# Patient Record
Sex: FEMALE | Race: WHITE | NOT HISPANIC OR LATINO | Employment: FULL TIME | URBAN - METROPOLITAN AREA
[De-identification: names, ages, dates, MRNs, and addresses within clinical notes are randomized per-mention and may not be internally consistent; named-entity substitution may affect disease eponyms.]

---

## 2022-10-22 ENCOUNTER — APPOINTMENT (OUTPATIENT)
Dept: URGENT CARE | Facility: CLINIC | Age: 22
End: 2022-10-22

## 2023-06-20 ENCOUNTER — OFFICE VISIT (OUTPATIENT)
Dept: MEDICAL GROUP | Facility: MEDICAL CENTER | Age: 23
End: 2023-06-20
Payer: COMMERCIAL

## 2023-06-20 VITALS
HEART RATE: 60 BPM | SYSTOLIC BLOOD PRESSURE: 100 MMHG | BODY MASS INDEX: 23.25 KG/M2 | WEIGHT: 148.15 LBS | OXYGEN SATURATION: 98 % | DIASTOLIC BLOOD PRESSURE: 64 MMHG | RESPIRATION RATE: 16 BRPM | HEIGHT: 67 IN | TEMPERATURE: 97.4 F

## 2023-06-20 DIAGNOSIS — Z12.83 SKIN EXAM, SCREENING FOR CANCER: ICD-10-CM

## 2023-06-20 DIAGNOSIS — Z23 IMMUNIZATION DUE: ICD-10-CM

## 2023-06-20 DIAGNOSIS — Z00.00 ENCOUNTER FOR MEDICAL EXAMINATION TO ESTABLISH CARE: ICD-10-CM

## 2023-06-20 DIAGNOSIS — R22.31 LUMP OF AXILLA, RIGHT: ICD-10-CM

## 2023-06-20 DIAGNOSIS — Z97.5 IUD (INTRAUTERINE DEVICE) IN PLACE: ICD-10-CM

## 2023-06-20 PROCEDURE — 3078F DIAST BP <80 MM HG: CPT | Performed by: STUDENT IN AN ORGANIZED HEALTH CARE EDUCATION/TRAINING PROGRAM

## 2023-06-20 PROCEDURE — 3074F SYST BP LT 130 MM HG: CPT | Performed by: STUDENT IN AN ORGANIZED HEALTH CARE EDUCATION/TRAINING PROGRAM

## 2023-06-20 PROCEDURE — 99204 OFFICE O/P NEW MOD 45 MIN: CPT | Performed by: STUDENT IN AN ORGANIZED HEALTH CARE EDUCATION/TRAINING PROGRAM

## 2023-06-20 ASSESSMENT — PATIENT HEALTH QUESTIONNAIRE - PHQ9: CLINICAL INTERPRETATION OF PHQ2 SCORE: 0

## 2023-06-20 NOTE — PROGRESS NOTES
"Subjective:     CC:  Diagnoses of Encounter for medical examination to establish care, Lump of axilla, right, Skin exam, screening for cancer, IUD (intrauterine device) in place, and Immunization due were pertinent to this visit.    HISTORY OF THE PRESENT ILLNESS: Patient is a 23 y.o. female. This pleasant patient is here today to establish care and discuss the following    Problem   Lump of Axilla, Right    This is a chronic condition, never evaluated.  She has a lump in the right axilla.  She states it has been there for about a year.  She does not have any other lumps or bumps.  She does not have any family history of cancer apart from skin cancers.       ROS:   ROS      Objective:     Exam: /64   Pulse 60   Temp 36.3 °C (97.4 °F) (Temporal)   Resp 16   Ht 1.702 m (5' 7\")   Wt 67.2 kg (148 lb 2.4 oz)   SpO2 98%  Body mass index is 23.2 kg/m².    Physical Exam  Vitals reviewed.   Constitutional:       General: She is not in acute distress.     Appearance: She is not toxic-appearing.   HENT:      Head: Normocephalic and atraumatic.      Right Ear: External ear normal.      Left Ear: External ear normal.   Eyes:      General:         Right eye: No discharge.         Left eye: No discharge.      Extraocular Movements: Extraocular movements intact.      Conjunctiva/sclera: Conjunctivae normal.   Cardiovascular:      Rate and Rhythm: Normal rate and regular rhythm.      Pulses: Normal pulses.      Heart sounds: Normal heart sounds.   Pulmonary:      Effort: Pulmonary effort is normal. No respiratory distress.      Breath sounds: Normal breath sounds. No wheezing or rales.   Musculoskeletal:      Comments: 2 cm hard fixed lesion in the right axilla, nontender   Skin:     General: Skin is warm and dry.   Neurological:      Mental Status: She is alert.   Psychiatric:         Mood and Affect: Mood normal.         Behavior: Behavior normal.         Thought Content: Thought content normal.         Judgment: " Judgment normal.         Assessment & Plan:   23 y.o. female with the following -    1. Encounter for medical examination to establish care  History, problem list, medications and allergies reviewed.  Records requested from previous provider if applicable.    2. Lump of axilla, right  This is a chronic condition, never evaluated.  It has been there for about a year.  May be a lipoma, could also be a lymph node.  Unsure of diagnosis or prognosis.  Ultrasound ordered  - US-EXTREMITY NON VASCULAR UNILATERAL RIGHT; Future    3. Skin exam, screening for cancer  - Referral to Dermatology    4. IUD (intrauterine device) in place  - Referral to OB/Gyn    5. Immunization due  Per chart she is due for Tdap, she thinks she may have got this in 2020, she is going to check her records      No follow-ups on file.    Please note that this dictation was created using voice recognition software. I have made every reasonable attempt to correct obvious errors, but I expect that there are errors of grammar and possibly content that I did not discover before finalizing the note.

## 2023-06-30 ENCOUNTER — HOSPITAL ENCOUNTER (OUTPATIENT)
Dept: RADIOLOGY | Facility: MEDICAL CENTER | Age: 23
End: 2023-06-30
Payer: COMMERCIAL

## 2023-07-11 ENCOUNTER — HOSPITAL ENCOUNTER (OUTPATIENT)
Dept: RADIOLOGY | Facility: MEDICAL CENTER | Age: 23
End: 2023-07-11
Attending: STUDENT IN AN ORGANIZED HEALTH CARE EDUCATION/TRAINING PROGRAM
Payer: COMMERCIAL

## 2023-07-11 DIAGNOSIS — R22.31 LUMP OF AXILLA, RIGHT: ICD-10-CM

## 2023-07-11 PROCEDURE — 76642 ULTRASOUND BREAST LIMITED: CPT | Mod: RT

## 2023-07-17 ENCOUNTER — APPOINTMENT (OUTPATIENT)
Dept: RADIOLOGY | Facility: MEDICAL CENTER | Age: 23
End: 2023-07-17
Attending: STUDENT IN AN ORGANIZED HEALTH CARE EDUCATION/TRAINING PROGRAM
Payer: COMMERCIAL

## 2023-08-09 ENCOUNTER — OFFICE VISIT (OUTPATIENT)
Dept: DERMATOLOGY | Facility: IMAGING CENTER | Age: 23
End: 2023-08-09
Payer: COMMERCIAL

## 2023-08-09 DIAGNOSIS — L81.4 LENTIGINES: ICD-10-CM

## 2023-08-09 DIAGNOSIS — Z12.83 SKIN CANCER SCREENING: ICD-10-CM

## 2023-08-09 DIAGNOSIS — D22.9 MULTIPLE NEVI: ICD-10-CM

## 2023-08-09 DIAGNOSIS — L70.0 ACNE VULGARIS: ICD-10-CM

## 2023-08-09 DIAGNOSIS — D18.01 CHERRY ANGIOMA: ICD-10-CM

## 2023-08-09 PROCEDURE — 99213 OFFICE O/P EST LOW 20 MIN: CPT | Performed by: NURSE PRACTITIONER

## 2023-08-09 NOTE — PROGRESS NOTES
DERMATOLOGY NOTE  NEW VISIT       Chief complaint: Establish Care (PRETTY)     Acne Mild  Started: Couple months  Active on: Back  Aggravated by: No  Treatment currently used: No  Prior treatments used: No  Face wash/moisturizer: OTC unknown  Family history of scarring acne: No  Contraception: Copper IU  Family h/o breast/uterine/ovarian cancers: No     History of skin cancer: No  History of precancers/actinic keratoses: No  History of biopsies:No  History of blistering/severe sunburns:No  Family history of skin cancer:Yes, Details: Paternal grandparents melanoma  Family history of atypical moles:No      Not on File     MEDICATIONS:  Medications relevant to specialty reviewed.     REVIEW OF SYSTEMS:   Positive for skin (see HPI)  Negative for fevers and chills       EXAM:  There were no vitals taken for this visit.  Constitutional: Well-developed, well-nourished, and in no distress.     A total body skin exam was performed excluding the genitals per patient preference and including the following areas: head (including face), neck, chest, abdomen, groin/buttocks, back, bilateral upper extremities, and bilateral lower extremities with the following pertinent findings listed below and/or in assessment/plan.     -Sun exposed skin of trunk and b/l upper, lower extremities and face with very few scattered clinically benign light brown reticulated macules all of which were morphologically similar and none of which were suspicious for skin cancer today on exam    -Very few scattered 1-3mm bright red macules and thin papules on the trunk and extremities    -Multiple tan medium brown skin-colored macules papules scattered over the trunk, face extremities and scalp--All with benign-appearing pigment network patterns on dermoscopy    1-2 erythematous papules, zero pustules, 1-2 closed comedones--very few lesions on back and face        IMPRESSION / PLAN:    1. Lentigines  - Benign-appearing nature of lesions discussed during exam.    - Advised to continue to monitor for any return to clinic for new or concerning changes.      2. Cherry angioma  - Benign-appearing nature of lesions discussed during exam.   - Advised to continue to monitor for any return to clinic for new or concerning changes..      3. Multiple nevi  - Benign-appearing nature of lesions discussed during exam.   - Advised to continue to monitor for any return to clinic for new or concerning changes.  - ABCDE's of melanoma discussed/handout given      4. Acne vulgaris. mild  Discussed OTC first line treatment options--BPOs, retinols, body washes with BPO or SA  Stressed importance of twice daily face wash and moisturizer    5. Skin cancer screening  Skin cancer education  discussed importance of sun protective clothing, eyewear in addition to the use of broad spectrum sunscreen with SPF 30 or greater, as well as need for reapplication ~every 2 hours when exposed to UVR/handout given  discussed importance following up for any new or changing lesions as noted in handout given, but every 12 months exams in clinic in the setting of dermatologic history  ABCDE's of melanoma discussed/handout given            Please note that this dictation was created using voice recognition software. I have made every reasonable attempt to correct obvious errors, but I expect that there are errors of grammar and possibly content that I did not discover before finalizing the note.      Return to clinic in: Return in about 1 year (around 8/9/2024) for PRETTY. and as needed for any new or changing skin lesions.

## 2024-02-15 ENCOUNTER — OFFICE VISIT (OUTPATIENT)
Dept: MEDICAL GROUP | Facility: MEDICAL CENTER | Age: 24
End: 2024-02-15
Payer: COMMERCIAL

## 2024-02-15 ENCOUNTER — HOSPITAL ENCOUNTER (OUTPATIENT)
Dept: LAB | Facility: MEDICAL CENTER | Age: 24
End: 2024-02-15
Attending: STUDENT IN AN ORGANIZED HEALTH CARE EDUCATION/TRAINING PROGRAM
Payer: COMMERCIAL

## 2024-02-15 VITALS
HEART RATE: 60 BPM | WEIGHT: 143 LBS | SYSTOLIC BLOOD PRESSURE: 124 MMHG | TEMPERATURE: 97.3 F | OXYGEN SATURATION: 98 % | DIASTOLIC BLOOD PRESSURE: 72 MMHG | HEIGHT: 67 IN | BODY MASS INDEX: 22.44 KG/M2

## 2024-02-15 DIAGNOSIS — Z11.1 SCREENING-PULMONARY TB: ICD-10-CM

## 2024-02-15 DIAGNOSIS — Z11.59 NEED FOR HEPATITIS C SCREENING TEST: ICD-10-CM

## 2024-02-15 DIAGNOSIS — Z01.84 IMMUNITY STATUS TESTING: ICD-10-CM

## 2024-02-15 DIAGNOSIS — Z11.4 ENCOUNTER FOR SCREENING FOR HIV: ICD-10-CM

## 2024-02-15 DIAGNOSIS — Z23 IMMUNIZATION DUE: ICD-10-CM

## 2024-02-15 DIAGNOSIS — Z00.00 ANNUAL PHYSICAL EXAM: ICD-10-CM

## 2024-02-15 LAB
HBV SURFACE AB SERPL IA-ACNC: 16.2 MIU/ML (ref 0–10)
HCV AB SER QL: NORMAL
HIV 1+2 AB+HIV1 P24 AG SERPL QL IA: NORMAL

## 2024-02-15 PROCEDURE — 36415 COLL VENOUS BLD VENIPUNCTURE: CPT

## 2024-02-15 PROCEDURE — 90686 IIV4 VACC NO PRSV 0.5 ML IM: CPT | Performed by: STUDENT IN AN ORGANIZED HEALTH CARE EDUCATION/TRAINING PROGRAM

## 2024-02-15 PROCEDURE — 3074F SYST BP LT 130 MM HG: CPT | Performed by: STUDENT IN AN ORGANIZED HEALTH CARE EDUCATION/TRAINING PROGRAM

## 2024-02-15 PROCEDURE — 90471 IMMUNIZATION ADMIN: CPT | Performed by: STUDENT IN AN ORGANIZED HEALTH CARE EDUCATION/TRAINING PROGRAM

## 2024-02-15 PROCEDURE — 90715 TDAP VACCINE 7 YRS/> IM: CPT | Performed by: STUDENT IN AN ORGANIZED HEALTH CARE EDUCATION/TRAINING PROGRAM

## 2024-02-15 PROCEDURE — 90472 IMMUNIZATION ADMIN EACH ADD: CPT | Performed by: STUDENT IN AN ORGANIZED HEALTH CARE EDUCATION/TRAINING PROGRAM

## 2024-02-15 PROCEDURE — 99395 PREV VISIT EST AGE 18-39: CPT | Mod: 25 | Performed by: STUDENT IN AN ORGANIZED HEALTH CARE EDUCATION/TRAINING PROGRAM

## 2024-02-15 PROCEDURE — 86706 HEP B SURFACE ANTIBODY: CPT

## 2024-02-15 PROCEDURE — 86803 HEPATITIS C AB TEST: CPT

## 2024-02-15 PROCEDURE — 87389 HIV-1 AG W/HIV-1&-2 AB AG IA: CPT

## 2024-02-15 PROCEDURE — 86480 TB TEST CELL IMMUN MEASURE: CPT

## 2024-02-15 PROCEDURE — 3078F DIAST BP <80 MM HG: CPT | Performed by: STUDENT IN AN ORGANIZED HEALTH CARE EDUCATION/TRAINING PROGRAM

## 2024-02-15 ASSESSMENT — PATIENT HEALTH QUESTIONNAIRE - PHQ9: CLINICAL INTERPRETATION OF PHQ2 SCORE: 0

## 2024-02-15 NOTE — PROGRESS NOTES
Subjective:     CC:   Chief Complaint   Patient presents with    Annual Exam    Requesting Labs    Immunizations       HPI:   Beatrice Gomez is a 23 y.o. female who presents for annual exam. She is feeling well and denies any complaints.    Ob-Gyn/ History:    Patient has GYN provider: yes  /Para:  G0   Last Pap Smear:  8/3/2021. No history of abnormal pap smears.  Gyn Surgery:  NA.  Current Contraceptive Method:  Paraguard.     Health Maintenance  Cholesterol Screening: Na   Diabetes Screening: Na   Aspirin Use: NA    Diet: Discussed   Exercise: Discussed     Cancer screening  Colorectal Cancer Screening: Start at age 45    Lung Cancer Screening: NA    Cervical Cancer Screening: Up to date   Breast Cancer Screening: Start at age 40-50     Infectious disease screening/Immunizations  --HIV Screening: Ordered   --Hepatitis C Screening: Ordered   --Immunizations: TDaP and flu today    She  has no past medical history on file.  She  has a past surgical history that includes reconstruction, knee, acl (Bilateral).    Family History   Problem Relation Age of Onset    Diabetes Paternal Grandfather     Skin cancer Other     Ovarian Cancer Neg Hx     Tubal Cancer Neg Hx     Peritoneal Cancer Neg Hx     Colorectal Cancer Neg Hx     Breast Cancer Neg Hx        Social History     Socioeconomic History    Marital status: Single     Spouse name: Not on file    Number of children: Not on file    Years of education: Not on file    Highest education level: Not on file   Occupational History    Not on file   Tobacco Use    Smoking status: Never    Smokeless tobacco: Never   Substance and Sexual Activity    Alcohol use: Yes     Comment: occasionally    Drug use: Never    Sexual activity: Yes     Partners: Male   Other Topics Concern    Not on file   Social History Narrative    Not on file     Social Determinants of Health     Financial Resource Strain: Not on file   Food Insecurity: Not on file  "  Transportation Needs: Not on file   Physical Activity: Not on file   Stress: Not on file   Social Connections: Not on file   Intimate Partner Violence: Not on file   Housing Stability: Not on file       Patient Active Problem List    Diagnosis Date Noted    Lump of axilla, right 06/20/2023         Current Outpatient Medications   Medication Sig Dispense Refill    PARAGARD INTRAUTERINE COPPER IU by Intrauterine route.       No current facility-administered medications for this visit.     No Known Allergies      Objective:     /72   Pulse 60   Temp 36.3 °C (97.3 °F)   Ht 1.702 m (5' 7\")   Wt 64.9 kg (143 lb)   SpO2 98%   BMI 22.40 kg/m²   Body mass index is 22.4 kg/m².  Wt Readings from Last 4 Encounters:   02/15/24 64.9 kg (143 lb)   06/20/23 67.2 kg (148 lb 2.4 oz)       Physical Exam:  Constitutional: Well-developed and well-nourished. Not diaphoretic. No distress.   Skin: Skin is warm and dry. No rash noted.  Head: Atraumatic without lesions.  Eyes: Conjunctivae and extraocular motions are normal. Pupils are equal, round, and reactive to light. No scleral icterus.   Ears:  External ears unremarkable. Tympanic membranes clear and intact.  Nose: Nares patent. Septum midline. Turbinates without erythema nor edema. No discharge.   Mouth/Throat: Dentition is normal. Tongue normal. Oropharynx is clear and moist. Posterior pharynx without erythema or exudates.  Neck: Supple, trachea midline. Normal range of motion. No thyromegaly present. No lymphadenopathy--cervical or supraclavicular.  Cardiovascular: Regular rate and rhythm, S1 and S2 without murmur, rubs, or gallops.  Lungs: Normal inspiratory effort, CTA bilaterally, no wheezes/rhonchi/rales  Abdomen: Soft, non tender, and without distention. Active bowel sounds in all four quadrants. No rebound, guarding, masses or HSM.  Extremities: No cyanosis, clubbing, erythema, nor edema.   Musculoskeletal: All major joints AROM full in all directions without " pain.  Neurological: Alert and oriented x 3. DTRs 2+/3 and symmetric. No cranial nerve deficit. 5/5 myotomes. .   Psychiatric:  Behavior, mood, and affect are appropriate.      Assessment and Plan:     1. Annual physical exam        2. Immunization due  Tdap =>8yo IM    Influenza Vaccine Quad Injection (PF)      3. Immunity status testing  HEP B SURFACE AB      4. Screening-pulmonary TB  Quantiferon Gold TB (PPD)      5. Need for hepatitis C screening test  HEP C VIRUS ANTIBODY      6. Encounter for screening for HIV  HIV AG/AB COMBO ASSAY SCREENING        Discussed routine screening vaccination, healthy diet and exercise, helmet use, seatbelt use and sunscreen use.  Return for annual in 1 year    Please note that this dictation was created using voice recognition software. I have made every reasonable attempt to correct obvious errors, but I expect that there are errors of grammar and possibly content that I did not discover before finalizing the note.      Follow-up: No follow-ups on file.

## 2024-02-16 LAB
GAMMA INTERFERON BACKGROUND BLD IA-ACNC: 0.04 IU/ML
M TB IFN-G BLD-IMP: NEGATIVE
M TB IFN-G CD4+ BCKGRND COR BLD-ACNC: -0.01 IU/ML
MITOGEN IGNF BCKGRD COR BLD-ACNC: 8.59 IU/ML
QFT TB2 - NIL TBQ2: -0.01 IU/ML

## 2024-06-10 ENCOUNTER — OFFICE VISIT (OUTPATIENT)
Dept: DERMATOLOGY | Facility: IMAGING CENTER | Age: 24
End: 2024-06-10
Payer: COMMERCIAL

## 2024-06-10 DIAGNOSIS — D22.9 MULTIPLE NEVI: ICD-10-CM

## 2024-06-10 DIAGNOSIS — L81.4 LENTIGINES: ICD-10-CM

## 2024-06-10 DIAGNOSIS — L73.8 SEBACEOUS HYPERPLASIA: ICD-10-CM

## 2024-06-10 DIAGNOSIS — D18.01 CHERRY ANGIOMA: ICD-10-CM

## 2024-06-10 DIAGNOSIS — Z12.83 SKIN CANCER SCREENING: ICD-10-CM

## 2024-06-10 DIAGNOSIS — L70.0 ACNE VULGARIS: ICD-10-CM

## 2024-06-10 PROCEDURE — 99212 OFFICE O/P EST SF 10 MIN: CPT | Performed by: NURSE PRACTITIONER

## 2024-06-10 NOTE — PROGRESS NOTES
DERMATOLOGY NOTE  FOLLOW UP VISIT       Chief complaint: Annual Exam (PRETTY)       Annual PRETTY and Fv acne on back, still present.         Acne Mild  Started: Couple months  Active on: Back  Aggravated by: No  Treatment currently used: No  Prior treatments used: No  Face wash/moisturizer: OTC unknown  Family history of scarring acne: No  Contraception: Copper IU  Family h/o breast/uterine/ovarian cancers: No     History of skin cancer: No  History of precancers/actinic keratoses: No  History of biopsies:No  History of blistering/severe sunburns:No  Family history of skin cancer:Yes, Details: Paternal grandparents melanoma  Family history of atypical moles:No      No Known Allergies     MEDICATIONS:  Medications relevant to specialty reviewed.     REVIEW OF SYSTEMS:   Positive for skin (see HPI)  Negative for fevers and chills       EXAM:  There were no vitals taken for this visit.  Constitutional: Well-developed, well-nourished, and in no distress.     A total body skin exam was performed excluding the genitals per patient preference and including the following areas: head (including face), neck, chest, abdomen, groin/buttocks, back, bilateral upper extremities, and bilateral lower extremities with the following pertinent findings listed below and/or in assessment/plan.     -Sun exposed skin of trunk and b/l upper, lower extremities and face with very few scattered clinically benign light brown reticulated macules all of which were morphologically similar and none of which were suspicious for skin cancer today on exam    -Very few scattered 1-3mm bright red macules and thin papules on the trunk and extremities    -Multiple tan medium brown skin-colored macules papules scattered over the trunk, face extremities and scalp--All with benign-appearing pigment network patterns on dermoscopy    1-2 erythematous papules, zero pustules, 1-2 closed comedones--very few lesions on back and face    One yellowish/red papules with  telangiectasias and central dell to forehead      IMPRESSION / PLAN:    1. Lentigines  - Benign-appearing nature of lesions discussed during exam.   - Advised to continue to monitor for any return to clinic for new or concerning changes.      2. Cherry angioma  - Benign-appearing nature of lesions discussed during exam.   - Advised to continue to monitor for any return to clinic for new or concerning changes..      3. Multiple nevi  - Benign-appearing nature of lesions discussed during exam.   - Advised to continue to monitor for any return to clinic for new or concerning changes.  - ABCDE's of melanoma discussed/handout given      4. Acne vulgaris. mild  Discussed OTC first line treatment options--BPOs, retinols, body washes with BPO or SA  Stressed importance of twice daily face wash and moisturizer        5. Sebaceous hyperplasia  - Benign-appearing nature of lesions discussed during exam.   - discussed cosmetic tx and associated office fee  - Advised to continue to monitor for any return to clinic for new or concerning changes.        6. Skin cancer screening  Skin cancer education  discussed importance of sun protective clothing, eyewear in addition to the use of broad spectrum sunscreen with SPF 30 or greater, as well as need for reapplication ~every 2 hours when exposed to UVR/handout given  discussed importance following up for any new or changing lesions as noted in handout given, but every 12 months exams in clinic in the setting of dermatologic history  ABCDE's of melanoma discussed/handout given            Please note that this dictation was created using voice recognition software. I have made every reasonable attempt to correct obvious errors, but I expect that there are errors of grammar and possibly content that I did not discover before finalizing the note.      Return to clinic in: Return in about 1 year (around 6/10/2025) for PRETTY. and as needed for any new or changing skin lesions.

## 2024-10-08 ENCOUNTER — TELEPHONE (OUTPATIENT)
Dept: MEDICAL GROUP | Facility: MEDICAL CENTER | Age: 24
End: 2024-10-08

## 2024-10-08 ENCOUNTER — OFFICE VISIT (OUTPATIENT)
Dept: MEDICAL GROUP | Facility: MEDICAL CENTER | Age: 24
End: 2024-10-08
Payer: COMMERCIAL

## 2024-10-08 VITALS
HEIGHT: 67 IN | BODY MASS INDEX: 23.94 KG/M2 | SYSTOLIC BLOOD PRESSURE: 106 MMHG | OXYGEN SATURATION: 100 % | DIASTOLIC BLOOD PRESSURE: 60 MMHG | HEART RATE: 77 BPM | WEIGHT: 152.56 LBS | TEMPERATURE: 97.6 F

## 2024-10-08 DIAGNOSIS — R22.31 LUMP OF AXILLA, RIGHT: ICD-10-CM

## 2024-10-08 PROCEDURE — 3074F SYST BP LT 130 MM HG: CPT | Performed by: FAMILY MEDICINE

## 2024-10-08 PROCEDURE — 3078F DIAST BP <80 MM HG: CPT | Performed by: FAMILY MEDICINE

## 2024-10-08 PROCEDURE — 99214 OFFICE O/P EST MOD 30 MIN: CPT | Performed by: FAMILY MEDICINE

## 2024-10-08 ASSESSMENT — ENCOUNTER SYMPTOMS
FEVER: 0
CHILLS: 0
PALPITATIONS: 0

## 2024-11-01 ENCOUNTER — APPOINTMENT (OUTPATIENT)
Dept: RADIOLOGY | Facility: MEDICAL CENTER | Age: 24
End: 2024-11-01
Attending: FAMILY MEDICINE
Payer: COMMERCIAL

## 2024-12-04 ENCOUNTER — HOSPITAL ENCOUNTER (OUTPATIENT)
Dept: RADIOLOGY | Facility: MEDICAL CENTER | Age: 24
End: 2024-12-04
Attending: FAMILY MEDICINE
Payer: COMMERCIAL

## 2024-12-04 DIAGNOSIS — L72.3 SEBACEOUS CYST OF RIGHT AXILLA: ICD-10-CM

## 2024-12-04 DIAGNOSIS — R22.31 LUMP OF AXILLA, RIGHT: ICD-10-CM

## 2024-12-04 PROCEDURE — 76642 ULTRASOUND BREAST LIMITED: CPT | Mod: RT

## 2025-01-03 ENCOUNTER — TELEPHONE (OUTPATIENT)
Dept: SURGERY | Facility: MEDICAL CENTER | Age: 25
End: 2025-01-03
Payer: COMMERCIAL

## 2025-01-03 NOTE — TELEPHONE ENCOUNTER
"Patient left a voicemail and stated \"Referral already has been taken care of). I completed referral in the que; as patient doesn't need an appointment in our office.   "

## 2025-01-08 ENCOUNTER — PATIENT MESSAGE (OUTPATIENT)
Dept: MEDICAL GROUP | Facility: MEDICAL CENTER | Age: 25
End: 2025-01-08
Payer: COMMERCIAL

## 2025-01-09 RX ORDER — COPPER 313.4 MG/1
INTRAUTERINE DEVICE INTRAUTERINE
COMMUNITY

## 2025-01-10 ENCOUNTER — OFFICE VISIT (OUTPATIENT)
Dept: SURGERY | Facility: MEDICAL CENTER | Age: 25
End: 2025-01-10
Payer: COMMERCIAL

## 2025-01-10 VITALS
SYSTOLIC BLOOD PRESSURE: 119 MMHG | DIASTOLIC BLOOD PRESSURE: 68 MMHG | TEMPERATURE: 97.1 F | HEIGHT: 67 IN | BODY MASS INDEX: 24.17 KG/M2 | HEART RATE: 69 BPM | WEIGHT: 154 LBS | OXYGEN SATURATION: 98 %

## 2025-01-10 DIAGNOSIS — R22.31 LUMP OF AXILLA, RIGHT: ICD-10-CM

## 2025-01-10 PROCEDURE — 3074F SYST BP LT 130 MM HG: CPT | Performed by: SURGERY

## 2025-01-10 PROCEDURE — 99203 OFFICE O/P NEW LOW 30 MIN: CPT | Performed by: SURGERY

## 2025-01-10 PROCEDURE — 3078F DIAST BP <80 MM HG: CPT | Performed by: SURGERY

## 2025-01-10 ASSESSMENT — ENCOUNTER SYMPTOMS
NEUROLOGICAL NEGATIVE: 1
RESPIRATORY NEGATIVE: 1
CONSTITUTIONAL NEGATIVE: 1
ENDOCRINE NEGATIVE: 1
CARDIOVASCULAR NEGATIVE: 1
MUSCULOSKELETAL NEGATIVE: 1
EYES NEGATIVE: 1
HEMATOLOGIC/LYMPHATIC NEGATIVE: 1
GASTROINTESTINAL NEGATIVE: 1
PSYCHIATRIC NEGATIVE: 1

## 2025-01-10 NOTE — PROGRESS NOTES
Subjective     Beatrice Gomez is a 24 y.o. female who presents for evaluation of a right axillary likely epidermal inclusion cyst.  She reports that it first appeared about 2 years ago and has slowly increased in size, which is bothersome to her.    Routine self breast exams: Yes  Breast pain: No   Nipple discharge: No   Skin changes: No   Masses: Yes (right axilla)  Contour/nipple changes: Yes (visible tenting of right axillary skin)  Previous breast biopsy or surgery: No     Age at menarche: 11  Age at menopause: premenopausal  Age at first birth: N/A    Hormone replacement therapy: No     Family history of cancer: [place] breast cancer.    Lifetime (5-yr) breast cancer risk calculations  Tyrer-Nicolezick v7: 18.0% (0.11%)  Samanthaer-Nicolezick v8: 17.8% (0.11%)  Keyonna model: N/A    Imaging  - Right limited ultrasound ([facility]) 2023: Right axillary 1.7cm subcutaneous cyst. BIRADS 2.  - Right limited ultrasound ([facility]) 2024: Right axillary subcutaneous cyst increased in size to 2.1cm, suggestive of epidermal inclusion cyst. BIRADS 2.  All imaging personally reviewed (internal and external images).    Past Medical History   History reviewed. No pertinent past medical history.    Surgical History  Past Surgical History:   Procedure Laterality Date    RECONSTRUCTION, KNEE, ACL Bilateral        Family History  Family History   Problem Relation Age of Onset    Breast Cancer Paternal Aunt 66    Diabetes Paternal Grandfather     Skin cancer Other     Ovarian Cancer Neg Hx     Tubal Cancer Neg Hx     Peritoneal Cancer Neg Hx     Colorectal Cancer Neg Hx        Social History  Social History     Socioeconomic History    Marital status: Single     Spouse name: Not on file    Number of children: Not on file    Years of education: Not on file    Highest education level: Not on file   Occupational History    Not on file   Tobacco Use    Smoking status: Never    Smokeless tobacco: Never   Vaping Use     "Vaping status: Never Used   Substance and Sexual Activity    Alcohol use: Yes     Comment: occasionally    Drug use: Never    Sexual activity: Yes     Partners: Male     Birth control/protection: I.U.D.     Comment: Varun IUD place 2021   Other Topics Concern    Not on file   Social History Narrative    Not on file     Social Drivers of Health     Financial Resource Strain: Not on file   Food Insecurity: Not on file   Transportation Needs: Not on file   Physical Activity: Not on file   Stress: Not on file   Social Connections: Not on file   Intimate Partner Violence: Not on file   Housing Stability: Not on file       Review of Systems  Review of Systems   Constitutional: Negative.    HENT:  Negative.     Eyes: Negative.    Respiratory: Negative.     Cardiovascular: Negative.    Gastrointestinal: Negative.    Endocrine: Negative.    Genitourinary: Negative.     Musculoskeletal: Negative.    Skin: Negative.    Neurological: Negative.    Hematological: Negative.    Psychiatric/Behavioral: Negative.          Objective   /68 (BP Location: Left arm, Patient Position: Sitting, BP Cuff Size: Large adult)   Pulse 69   Temp 36.2 °C (97.1 °F) (Temporal)   Ht 1.702 m (5' 7\")   Wt 69.9 kg (154 lb)   SpO2 98%   BMI 24.12 kg/m²    Physical Exam  Vitals and nursing note reviewed.   Constitutional:       General: She is not in acute distress.     Appearance: Normal appearance.   HENT:      Head: Normocephalic and atraumatic.      Right Ear: External ear normal.      Left Ear: External ear normal.      Nose: Nose normal.      Mouth/Throat:      Pharynx: Oropharynx is clear.   Eyes:      General: No scleral icterus.     Conjunctiva/sclera: Conjunctivae normal.   Cardiovascular:      Rate and Rhythm: Normal rate and regular rhythm.      Heart sounds: Normal heart sounds. No murmur heard.     No friction rub. No gallop.   Pulmonary:      Effort: Pulmonary effort is normal. No respiratory distress.      Breath sounds: Normal " breath sounds. No wheezing, rhonchi or rales.   Chest:   Breasts:     Momo Score is 5.      Breasts are symmetrical.      Right: Normal. No swelling, bleeding, inverted nipple, mass, nipple discharge or skin change.      Left: Normal. No swelling, bleeding, inverted nipple, mass, nipple discharge or skin change.          Comments: Bilateral breasts examined in the upright and supine positions.  No suspicious skin changes (erythema, peau d'orange).  No unexpected contour abnormalities in either breast; there is an obvious tenting mass in the right axilla with arm fully abducted.  Bilateral breast tissue very dense with no dominant masses or nodules.  Bilateral nipples everted without expressible discharge.  No palpable cervical, supraclavicular, or axillary adenopathy bilaterally; the palpable mass feels superficial/superior to the anatomic axilla.  Bedside ultrasound performed of the right axilla with the GE 12mHz linear probe confirming a round superficial circumscribed anechoic structure that is very close to the dermis but not clearly involving dermis; I do not see a track from the cyst to the skin surface.  Abdominal:      General: Abdomen is flat. There is no distension.      Palpations: Abdomen is soft. There is no mass.   Musculoskeletal:         General: No swelling or deformity. Normal range of motion.      Cervical back: Neck supple.   Lymphadenopathy:      Cervical: No cervical adenopathy.      Upper Body:      Right upper body: No supraclavicular or axillary adenopathy.      Left upper body: No supraclavicular or axillary adenopathy.   Skin:     General: Skin is warm and dry.      Capillary Refill: Capillary refill takes less than 2 seconds.   Neurological:      General: No focal deficit present.      Mental Status: She is alert and oriented to person, place, and time.   Psychiatric:         Mood and Affect: Mood normal.         Behavior: Behavior normal.         Thought Content: Thought content  normal.         Judgment: Judgment normal.     [facility] ECOG Performance Status categories: 0= Fully active, able to carry on all pre-disease performance without restriction.  FUNCTIONAL ASSESSMENT  Patient responses to questions:  - Have you ever had shoulder surgery or been treated for a shoulder injury that resulted in a loss of range of motion?  No   - Are you unable to reach into an upper cabinet and retrieve a cup or plate?  No   - Do you have any limitations in daily activities because of your shoulder?  No   Overhead raise test for shoulder flexion:  Normal Range:  150-180 degrees    Assessment & Plan   The patient is a delightful 24 y.o. female with a right axillary likely epidermal inclusion cyst.  This is enlarging and bothersome to the patient, and she would like to have it excised.  A thorough discussion was held with the patient of the indications, alternatives, risks (including anesthetic complications and the potential need for more than one operation), as well as the expected outcomes.  After this discussion with shared decision-making, the patient would like to proceed with surgery scheduling for right axillary excisional biopsy.  She will look at her school schedule and aim for a time that will be least impactful for her studies [place] [place]    The patient has the following potential barriers to care:  School responsibilities.  We will work with her schedule.    We discussed postoperative pain and that we will utilize a multimodal approach to pain control that may include preoperative medications given before surgery, intraoperative nerve blocks and local anesthetic, nonopiate pain medications during and after surgery, and postoperative nonopiate pain medications including antiinflammatory medication and acetaminophen.  If these measures are inadequate to control her pain, opiate medications may be used on a short-term basis to aid in postoperative recovery and mobilization.  The patient does not have  a history of chronic opiate use or substance abuse and has been educated about avoiding use of controlled substances with alcohol, marijuana, and/or illicit substances.  A drug utilization report will be fully reviewed prior to providing a prescription for any controlled substance if that prescription is deemed necessary.     A total of 30 minutes were spent on and with this patient today, including review of records, independent review of imaging, history and physical exam, counseling, documentation of exam, and coordination of care.      Problem   Lump of Axilla, Right    Right axillary 2.1cm subcutaneous cyst suggestive of epidermal inclusion cyst.  - Planned excision ([facility])

## 2025-04-22 ENCOUNTER — OFFICE VISIT (OUTPATIENT)
Dept: MEDICAL GROUP | Facility: MEDICAL CENTER | Age: 25
End: 2025-04-22
Payer: COMMERCIAL

## 2025-04-22 VITALS
DIASTOLIC BLOOD PRESSURE: 54 MMHG | WEIGHT: 148.6 LBS | SYSTOLIC BLOOD PRESSURE: 98 MMHG | OXYGEN SATURATION: 99 % | HEIGHT: 67 IN | TEMPERATURE: 97.3 F | BODY MASS INDEX: 23.32 KG/M2 | HEART RATE: 71 BPM

## 2025-04-22 DIAGNOSIS — Z13.6 SCREENING FOR CARDIOVASCULAR CONDITION: ICD-10-CM

## 2025-04-22 DIAGNOSIS — Z13.0 SCREENING, ANEMIA, DEFICIENCY, IRON: ICD-10-CM

## 2025-04-22 DIAGNOSIS — Z00.00 ANNUAL PHYSICAL EXAM: ICD-10-CM

## 2025-04-22 DIAGNOSIS — Z13.1 SCREENING FOR DIABETES MELLITUS: ICD-10-CM

## 2025-04-22 DIAGNOSIS — Z31.41 FERTILITY TESTING: ICD-10-CM

## 2025-04-22 PROCEDURE — 3078F DIAST BP <80 MM HG: CPT | Performed by: STUDENT IN AN ORGANIZED HEALTH CARE EDUCATION/TRAINING PROGRAM

## 2025-04-22 PROCEDURE — 99395 PREV VISIT EST AGE 18-39: CPT | Performed by: STUDENT IN AN ORGANIZED HEALTH CARE EDUCATION/TRAINING PROGRAM

## 2025-04-22 PROCEDURE — 3074F SYST BP LT 130 MM HG: CPT | Performed by: STUDENT IN AN ORGANIZED HEALTH CARE EDUCATION/TRAINING PROGRAM

## 2025-04-22 ASSESSMENT — PATIENT HEALTH QUESTIONNAIRE - PHQ9: CLINICAL INTERPRETATION OF PHQ2 SCORE: 0

## 2025-04-22 NOTE — PROGRESS NOTES
Subjective:     CC:   Chief Complaint   Patient presents with    Annual Exam    Requesting Labs       HPI:   Beatrice Gomez is a 24 y.o. female who presents for annual exam. She is feeling well and denies any complaints.  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  This is going well, reasonable amounts of stress but handles it well.    Ob-Gyn/ History:    Patient has GYN provider: yes  /Para:  G0  Last Pap Smear:  . No history of abnormal pap smears.  Gyn Surgery:  NA.  Current Contraceptive Method:  IUD. She is currently sexually active.    Health Maintenance  Diet: Fruits vegetables, lean protein and grains   Exercise: Exercises about 3 times a week   Substance Abuse: N/A     Cancer screening  Colorectal Cancer Screening: Started age 44   Lung Cancer Screening: N/A   Cervical Cancer Screening: Due in  per review OB/GYN note   Breast Cancer Screening: Started age 39     Infectious disease screening/Immunizations  --HIV Screening: Up to date   --Hepatitis C Screening: Up to date   --Immunizations: Up-to-date apart from COVID-vaccine, advised booster prior to starting clinicals    She  has no past medical history on file.  She  has a past surgical history that includes reconstruction, knee, acl (Bilateral).    Family History   Problem Relation Age of Onset    Breast Cancer Paternal Aunt 66    Diabetes Paternal Grandfather     Skin cancer Other     Ovarian Cancer Neg Hx     Tubal Cancer Neg Hx     Peritoneal Cancer Neg Hx     Colorectal Cancer Neg Hx        Social History     Socioeconomic History    Marital status: Single     Spouse name: Not on file    Number of children: Not on file    Years of education: Not on file    Highest education level: Not on file   Occupational History    Not on file   Tobacco Use    Smoking status: Never    Smokeless tobacco: Never   Vaping Use    Vaping status: Never Used   Substance and Sexual Activity    Alcohol use: Yes     Comment: 2 drinks a  "month    Drug use: Never    Sexual activity: Yes     Partners: Male     Birth control/protection: I.U.D.     Comment: Varun IUD place 2021   Other Topics Concern    Not on file   Social History Narrative    Not on file     Social Drivers of Health     Financial Resource Strain: Not on file   Food Insecurity: Not on file   Transportation Needs: Not on file   Physical Activity: Not on file   Stress: Not on file   Social Connections: Not on file   Intimate Partner Violence: Not on file   Housing Stability: Not on file       Patient Active Problem List    Diagnosis Date Noted    Lump of axilla, right 06/20/2023         Current Outpatient Medications   Medication Sig Dispense Refill    Paragard Intrauterine Copper IUD by Intrauterine route.       No current facility-administered medications for this visit.     No Known Allergies      Objective:     BP 98/54 (BP Location: Left arm, Patient Position: Sitting, BP Cuff Size: Adult)   Pulse 71   Temp 36.3 °C (97.3 °F) (Temporal)   Ht 1.702 m (5' 7\")   Wt 67.4 kg (148 lb 9.6 oz)   SpO2 99%   BMI 23.27 kg/m²   Body mass index is 23.27 kg/m².  Wt Readings from Last 4 Encounters:   04/22/25 67.4 kg (148 lb 9.6 oz)   01/10/25 69.9 kg (154 lb)   10/08/24 69.2 kg (152 lb 8.9 oz)   02/15/24 64.9 kg (143 lb)       Physical Exam:  Constitutional: Well-developed and well-nourished. Not diaphoretic. No distress.   Skin: Skin is warm and dry. No rash noted.  Head: Atraumatic without lesions.  Eyes: Conjunctivae and extraocular motions are normal. Pupils are equal, round, and reactive to light. No scleral icterus.   Ears:  External ears unremarkable. Tympanic membranes clear and intact.  Nose: Nares patent. Septum midline. Turbinates without erythema nor edema. No discharge.   Mouth/Throat: Dentition is normal. Tongue normal. Oropharynx is clear and moist. Posterior pharynx without erythema or exudates.  Neck: Supple, trachea midline. Normal range of motion. No thyromegaly present. " No lymphadenopathy--cervical or supraclavicular.  Cardiovascular: Regular rate and rhythm, S1 and S2 without murmur, rubs, or gallops.  Lungs: Normal inspiratory effort, CTA bilaterally, no wheezes/rhonchi/rales  Abdomen: Soft, non tender, and without distention. Active bowel sounds in all four quadrants. No rebound, guarding, masses or HSM.  Extremities: No cyanosis, clubbing, erythema, nor edema.   Musculoskeletal: All major joints AROM full in all directions without pain.  Psychiatric:  Behavior, mood, and affect are appropriate.      Assessment and Plan:     1. Annual physical exam        2. Screening for cardiovascular condition  Lipid Profile      3. Screening for diabetes mellitus  HEMOGLOBIN A1C    Comp Metabolic Panel      4. Screening, anemia, deficiency, iron  CBC WITH DIFFERENTIAL      5. Fertility testing  ANTI-MULLERIAN HORMONE([facility])        Discussed routine screening and vaccination, healthy diet, exercise, safe sex, sunscreen use, helmet and seatbelt use.  Return for annual exam in 1 year    Please note that this dictation was created using voice recognition software. I have made every reasonable attempt to correct obvious errors, but I expect that there are errors of grammar and possibly content that I did not discover before finalizing the note.      Follow-up: No follow-ups on file.

## 2025-04-24 ENCOUNTER — TELEPHONE (OUTPATIENT)
Dept: SURGERY | Facility: MEDICAL CENTER | Age: 25
End: 2025-04-24
Payer: COMMERCIAL

## 2025-04-28 ENCOUNTER — OFFICE VISIT (OUTPATIENT)
Dept: SURGERY | Facility: MEDICAL CENTER | Age: 25
End: 2025-04-28
Payer: COMMERCIAL

## 2025-04-28 VITALS
DIASTOLIC BLOOD PRESSURE: 78 MMHG | OXYGEN SATURATION: 97 % | HEIGHT: 67 IN | BODY MASS INDEX: 22.6 KG/M2 | WEIGHT: 144 LBS | SYSTOLIC BLOOD PRESSURE: 113 MMHG | HEART RATE: 69 BPM | TEMPERATURE: 97 F

## 2025-04-28 DIAGNOSIS — R22.31 LUMP OF AXILLA, RIGHT: ICD-10-CM

## 2025-04-28 NOTE — PROGRESS NOTES
"    SUBJECTIVE:   Beatrice Gomez is a delightful 24 y.o. female who presents in follow up for a right axillary cystic lesion.  Currently, she reports no changes in her symptoms, but she would like to have it excised for her own peace of mind.      OBJECTIVE:  /78 (BP Location: Right arm, Patient Position: Sitting, BP Cuff Size: Large adult)   Pulse 69   Temp 36.1 °C (97 °F) (Temporal)   Ht 1.702 m (5' 7\")   Wt 65.3 kg (144 lb)   SpO2 97%   BMI 22.55 kg/m²    General: Alert, oriented, pleasant, no acute distress.  HEENT: Extraocular movements intact, no scleral icterus or conjunctival injection.  CV: RRR, S1/S2, no murmur.  Pulmonary: Clear to auscultation bilaterally, no wheezes or crackles.  Abdomen: Soft, nondistended, nontender.  Extremities: Warm, well-perfused.  Breasts: Bilateral breasts examined in the upright and supine positions.  No suspicious skin findings on either side (erythema, peau d'orange).  Bilateral breasts are heterogeneously dense without dominant masses or nodules.  Bilateral nipples everted without expressible discharge.  No unexpected contour abnormalities.  No palpable cervical, supraclavicular, or axillary adenopathy.  Superficially in the right axilla within the hairbearing area is a ~2cm freely mobile lesion, nontender.    [place] ECOG Performance Status categories: 0= Fully active, able to carry on all pre-disease performance without restriction.  FUNCTIONAL ASSESSMENT  Patient responses to questions:  - Have you ever had shoulder surgery or been treated for a shoulder injury that resulted in a loss of range of motion?  No   - Are you unable to reach into an upper cabinet and retrieve a cup or plate?  No   - Do you have any limitations in daily activities because of your shoulder?  No   Overhead raise test for shoulder flexion:  Normal Range:  150-180 degrees    ASSESSMENT AND PLAN:  Delightful 24 y.o. female, here for follow-up of a right axillary lesion " suggestive of sebaceous cyst.  At this point she is ready for surgery scheduling for excision.  A thorough discussion of the indications, alternatives, risks, and expected outcomes of right axillary excisional biopsy was held with the patient.  All questions answered in detail.  We will proceed with surgery scheduling.    Problem   Lump of Axilla, Right    Right axillary 2.1cm subcutaneous cyst suggestive of epidermal inclusion cyst.  - Planned excision ([facility]).

## 2025-05-05 ENCOUNTER — DOCUMENTATION (OUTPATIENT)
Dept: SURGERY | Facility: MEDICAL CENTER | Age: 25
End: 2025-05-05
Payer: COMMERCIAL

## 2025-05-06 ENCOUNTER — HOSPITAL ENCOUNTER (OUTPATIENT)
Dept: LAB | Facility: MEDICAL CENTER | Age: 25
End: 2025-05-06
Attending: STUDENT IN AN ORGANIZED HEALTH CARE EDUCATION/TRAINING PROGRAM
Payer: COMMERCIAL

## 2025-05-06 DIAGNOSIS — Z13.1 SCREENING FOR DIABETES MELLITUS: ICD-10-CM

## 2025-05-06 DIAGNOSIS — Z13.0 SCREENING, ANEMIA, DEFICIENCY, IRON: ICD-10-CM

## 2025-05-06 DIAGNOSIS — Z31.41 FERTILITY TESTING: ICD-10-CM

## 2025-05-06 DIAGNOSIS — Z13.6 SCREENING FOR CARDIOVASCULAR CONDITION: ICD-10-CM

## 2025-05-06 LAB
ALBUMIN SERPL BCP-MCNC: 4.3 G/DL (ref 3.2–4.9)
ALBUMIN/GLOB SERPL: 1.5 G/DL
ALP SERPL-CCNC: 84 U/L (ref 30–99)
ALT SERPL-CCNC: 17 U/L (ref 2–50)
ANION GAP SERPL CALC-SCNC: 10 MMOL/L (ref 7–16)
AST SERPL-CCNC: 23 U/L (ref 12–45)
BASOPHILS # BLD AUTO: 0.6 % (ref 0–1.8)
BASOPHILS # BLD: 0.03 K/UL (ref 0–0.12)
BILIRUB SERPL-MCNC: 0.6 MG/DL (ref 0.1–1.5)
BUN SERPL-MCNC: 12 MG/DL (ref 8–22)
CALCIUM ALBUM COR SERPL-MCNC: 9.3 MG/DL (ref 8.5–10.5)
CALCIUM SERPL-MCNC: 9.5 MG/DL (ref 8.5–10.5)
CHLORIDE SERPL-SCNC: 107 MMOL/L (ref 96–112)
CHOLEST SERPL-MCNC: 195 MG/DL (ref 100–199)
CO2 SERPL-SCNC: 23 MMOL/L (ref 20–33)
CREAT SERPL-MCNC: 0.98 MG/DL (ref 0.5–1.4)
EOSINOPHIL # BLD AUTO: 0.11 K/UL (ref 0–0.51)
EOSINOPHIL NFR BLD: 2 % (ref 0–6.9)
ERYTHROCYTE [DISTWIDTH] IN BLOOD BY AUTOMATED COUNT: 45.6 FL (ref 35.9–50)
EST. AVERAGE GLUCOSE BLD GHB EST-MCNC: 120 MG/DL
FASTING STATUS PATIENT QL REPORTED: NORMAL
GFR SERPLBLD CREATININE-BSD FMLA CKD-EPI: 82 ML/MIN/1.73 M 2
GLOBULIN SER CALC-MCNC: 2.9 G/DL (ref 1.9–3.5)
GLUCOSE SERPL-MCNC: 90 MG/DL (ref 65–99)
HBA1C MFR BLD: 5.8 % (ref 4–5.6)
HCT VFR BLD AUTO: 43.8 % (ref 37–47)
HDLC SERPL-MCNC: 66 MG/DL
HGB BLD-MCNC: 14.3 G/DL (ref 12–16)
IMM GRANULOCYTES # BLD AUTO: 0 K/UL (ref 0–0.11)
IMM GRANULOCYTES NFR BLD AUTO: 0 % (ref 0–0.9)
LDLC SERPL CALC-MCNC: 119 MG/DL
LYMPHOCYTES # BLD AUTO: 2.24 K/UL (ref 1–4.8)
LYMPHOCYTES NFR BLD: 41.6 % (ref 22–41)
MCH RBC QN AUTO: 28.3 PG (ref 27–33)
MCHC RBC AUTO-ENTMCNC: 32.6 G/DL (ref 32.2–35.5)
MCV RBC AUTO: 86.7 FL (ref 81.4–97.8)
MONOCYTES # BLD AUTO: 0.54 K/UL (ref 0–0.85)
MONOCYTES NFR BLD AUTO: 10 % (ref 0–13.4)
NEUTROPHILS # BLD AUTO: 2.47 K/UL (ref 1.82–7.42)
NEUTROPHILS NFR BLD: 45.8 % (ref 44–72)
NRBC # BLD AUTO: 0 K/UL
NRBC BLD-RTO: 0 /100 WBC (ref 0–0.2)
PLATELET # BLD AUTO: 279 K/UL (ref 164–446)
PMV BLD AUTO: 10.5 FL (ref 9–12.9)
POTASSIUM SERPL-SCNC: 4.6 MMOL/L (ref 3.6–5.5)
PROT SERPL-MCNC: 7.2 G/DL (ref 6–8.2)
RBC # BLD AUTO: 5.05 M/UL (ref 4.2–5.4)
SODIUM SERPL-SCNC: 140 MMOL/L (ref 135–145)
TRIGL SERPL-MCNC: 50 MG/DL (ref 0–149)
WBC # BLD AUTO: 5.4 K/UL (ref 4.8–10.8)

## 2025-05-06 PROCEDURE — 82166 ASSAY ANTI-MULLERIAN HORM: CPT

## 2025-05-06 PROCEDURE — 83036 HEMOGLOBIN GLYCOSYLATED A1C: CPT

## 2025-05-06 PROCEDURE — 36415 COLL VENOUS BLD VENIPUNCTURE: CPT

## 2025-05-06 PROCEDURE — 80053 COMPREHEN METABOLIC PANEL: CPT

## 2025-05-06 PROCEDURE — 85025 COMPLETE CBC W/AUTO DIFF WBC: CPT

## 2025-05-06 PROCEDURE — 80061 LIPID PANEL: CPT

## 2025-05-07 ENCOUNTER — APPOINTMENT (OUTPATIENT)
Dept: ADMISSIONS | Facility: MEDICAL CENTER | Age: 25
End: 2025-05-07
Attending: SURGERY
Payer: COMMERCIAL

## 2025-05-07 ENCOUNTER — RESULTS FOLLOW-UP (OUTPATIENT)
Dept: MEDICAL GROUP | Facility: MEDICAL CENTER | Age: 25
End: 2025-05-07
Payer: COMMERCIAL

## 2025-05-09 LAB — MIS SERPL-MCNC: 7.78 NG/ML (ref 0.4–16.02)

## 2025-05-15 ENCOUNTER — PRE-ADMISSION TESTING (OUTPATIENT)
Dept: ADMISSIONS | Facility: MEDICAL CENTER | Age: 25
End: 2025-05-15
Attending: SURGERY
Payer: COMMERCIAL

## 2025-05-22 ENCOUNTER — HOSPITAL ENCOUNTER (OUTPATIENT)
Facility: MEDICAL CENTER | Age: 25
End: 2025-05-22
Attending: SURGERY | Admitting: SURGERY
Payer: COMMERCIAL

## 2025-05-22 ENCOUNTER — ANESTHESIA (OUTPATIENT)
Dept: SURGERY | Facility: MEDICAL CENTER | Age: 25
End: 2025-05-22
Payer: COMMERCIAL

## 2025-05-22 ENCOUNTER — APPOINTMENT (OUTPATIENT)
Dept: RADIOLOGY | Facility: MEDICAL CENTER | Age: 25
End: 2025-05-22
Attending: SURGERY
Payer: COMMERCIAL

## 2025-05-22 ENCOUNTER — ANESTHESIA EVENT (OUTPATIENT)
Dept: SURGERY | Facility: MEDICAL CENTER | Age: 25
End: 2025-05-22
Payer: COMMERCIAL

## 2025-05-22 VITALS
RESPIRATION RATE: 18 BRPM | OXYGEN SATURATION: 96 % | TEMPERATURE: 97 F | HEIGHT: 67 IN | HEART RATE: 66 BPM | DIASTOLIC BLOOD PRESSURE: 58 MMHG | BODY MASS INDEX: 22.21 KG/M2 | WEIGHT: 141.54 LBS | SYSTOLIC BLOOD PRESSURE: 111 MMHG

## 2025-05-22 DIAGNOSIS — R22.31 LUMP OF AXILLA, RIGHT: ICD-10-CM

## 2025-05-22 LAB
HCG UR QL: NEGATIVE
PATHOLOGY CONSULT NOTE: NORMAL

## 2025-05-22 PROCEDURE — 160046 HCHG PACU - 1ST 60 MINS PHASE II: Performed by: SURGERY

## 2025-05-22 PROCEDURE — 88305 TISSUE EXAM BY PATHOLOGIST: CPT | Mod: 26 | Performed by: PATHOLOGY

## 2025-05-22 PROCEDURE — 81025 URINE PREGNANCY TEST: CPT

## 2025-05-22 PROCEDURE — 700105 HCHG RX REV CODE 258: Performed by: SURGERY

## 2025-05-22 PROCEDURE — 160015 HCHG STAT PREOP MINUTES: Performed by: SURGERY

## 2025-05-22 PROCEDURE — 700102 HCHG RX REV CODE 250 W/ 637 OVERRIDE(OP): Performed by: ANESTHESIOLOGY

## 2025-05-22 PROCEDURE — 160039 HCHG SURGERY MINUTES - EA ADDL 1 MIN LEVEL 3: Performed by: SURGERY

## 2025-05-22 PROCEDURE — 21555 EXC NECK LES SC < 3 CM: CPT | Performed by: SURGERY

## 2025-05-22 PROCEDURE — A9270 NON-COVERED ITEM OR SERVICE: HCPCS | Performed by: ANESTHESIOLOGY

## 2025-05-22 PROCEDURE — 160035 HCHG PACU - 1ST 60 MINS PHASE I: Performed by: SURGERY

## 2025-05-22 PROCEDURE — 160048 HCHG OR STATISTICAL LEVEL 1-5: Performed by: SURGERY

## 2025-05-22 PROCEDURE — 700111 HCHG RX REV CODE 636 W/ 250 OVERRIDE (IP): Performed by: SURGERY

## 2025-05-22 PROCEDURE — 21555 EXC NECK LES SC < 3 CM: CPT | Mod: AS | Performed by: SPECIALIST

## 2025-05-22 PROCEDURE — 160002 HCHG RECOVERY MINUTES (STAT): Performed by: SURGERY

## 2025-05-22 PROCEDURE — 700111 HCHG RX REV CODE 636 W/ 250 OVERRIDE (IP): Mod: JZ | Performed by: ANESTHESIOLOGY

## 2025-05-22 PROCEDURE — 160009 HCHG ANES TIME/MIN: Performed by: SURGERY

## 2025-05-22 PROCEDURE — 700101 HCHG RX REV CODE 250: Performed by: ANESTHESIOLOGY

## 2025-05-22 PROCEDURE — 160028 HCHG SURGERY MINUTES - 1ST 30 MINS LEVEL 3: Performed by: SURGERY

## 2025-05-22 PROCEDURE — 88305 TISSUE EXAM BY PATHOLOGIST: CPT | Performed by: PATHOLOGY

## 2025-05-22 PROCEDURE — 160025 RECOVERY II MINUTES (STATS): Performed by: SURGERY

## 2025-05-22 PROCEDURE — 160047 HCHG PACU  - EA ADDL 30 MINS PHASE II: Performed by: SURGERY

## 2025-05-22 RX ORDER — ONDANSETRON 4 MG/1
4 TABLET, FILM COATED ORAL EVERY 6 HOURS PRN
Qty: 12 TABLET | Refills: 0 | Status: SHIPPED | OUTPATIENT
Start: 2025-05-22 | End: 2025-05-25

## 2025-05-22 RX ORDER — LIDOCAINE HYDROCHLORIDE 10 MG/ML
INJECTION, SOLUTION INFILTRATION; PERINEURAL
Status: DISCONTINUED | OUTPATIENT
Start: 2025-05-22 | End: 2025-05-22 | Stop reason: HOSPADM

## 2025-05-22 RX ORDER — ALPRAZOLAM 0.25 MG
.25-.5 TABLET ORAL
Status: DISCONTINUED | OUTPATIENT
Start: 2025-05-22 | End: 2025-05-22 | Stop reason: HOSPADM

## 2025-05-22 RX ORDER — LABETALOL HYDROCHLORIDE 5 MG/ML
5 INJECTION, SOLUTION INTRAVENOUS
Status: DISCONTINUED | OUTPATIENT
Start: 2025-05-22 | End: 2025-05-22 | Stop reason: HOSPADM

## 2025-05-22 RX ORDER — EPHEDRINE SULFATE 50 MG/ML
5 INJECTION, SOLUTION INTRAVENOUS
Status: DISCONTINUED | OUTPATIENT
Start: 2025-05-22 | End: 2025-05-22 | Stop reason: HOSPADM

## 2025-05-22 RX ORDER — HALOPERIDOL 5 MG/ML
1 INJECTION INTRAMUSCULAR
Status: DISCONTINUED | OUTPATIENT
Start: 2025-05-22 | End: 2025-05-22 | Stop reason: HOSPADM

## 2025-05-22 RX ORDER — ALBUTEROL SULFATE 5 MG/ML
2.5 SOLUTION RESPIRATORY (INHALATION)
Status: DISCONTINUED | OUTPATIENT
Start: 2025-05-22 | End: 2025-05-22 | Stop reason: HOSPADM

## 2025-05-22 RX ORDER — ONDANSETRON 2 MG/ML
4 INJECTION INTRAMUSCULAR; INTRAVENOUS
Status: DISCONTINUED | OUTPATIENT
Start: 2025-05-22 | End: 2025-05-22 | Stop reason: HOSPADM

## 2025-05-22 RX ORDER — LIDOCAINE HYDROCHLORIDE 10 MG/ML
INJECTION, SOLUTION EPIDURAL; INFILTRATION; INTRACAUDAL; PERINEURAL
Status: DISCONTINUED
Start: 2025-05-22 | End: 2025-05-22 | Stop reason: HOSPADM

## 2025-05-22 RX ORDER — EPINEPHRINE 1 MG/ML(1)
AMPUL (ML) INJECTION
Status: DISCONTINUED
Start: 2025-05-22 | End: 2025-05-22 | Stop reason: HOSPADM

## 2025-05-22 RX ORDER — ONDANSETRON 2 MG/ML
INJECTION INTRAMUSCULAR; INTRAVENOUS PRN
Status: DISCONTINUED | OUTPATIENT
Start: 2025-05-22 | End: 2025-05-22 | Stop reason: SURG

## 2025-05-22 RX ORDER — HYDROMORPHONE HYDROCHLORIDE 1 MG/ML
0.2 INJECTION, SOLUTION INTRAMUSCULAR; INTRAVENOUS; SUBCUTANEOUS
Status: DISCONTINUED | OUTPATIENT
Start: 2025-05-22 | End: 2025-05-22 | Stop reason: HOSPADM

## 2025-05-22 RX ORDER — TRAMADOL HYDROCHLORIDE 50 MG/1
50 TABLET ORAL EVERY 6 HOURS PRN
Qty: 12 TABLET | Refills: 0 | Status: SHIPPED | OUTPATIENT
Start: 2025-05-22 | End: 2025-05-25

## 2025-05-22 RX ORDER — BUPIVACAINE HYDROCHLORIDE AND EPINEPHRINE 5; 5 MG/ML; UG/ML
INJECTION, SOLUTION EPIDURAL; INTRACAUDAL; PERINEURAL
Status: DISCONTINUED
Start: 2025-05-22 | End: 2025-05-22 | Stop reason: HOSPADM

## 2025-05-22 RX ORDER — BUPIVACAINE HYDROCHLORIDE 2.5 MG/ML
INJECTION, SOLUTION EPIDURAL; INFILTRATION; INTRACAUDAL; PERINEURAL
Status: DISCONTINUED
Start: 2025-05-22 | End: 2025-05-22 | Stop reason: HOSPADM

## 2025-05-22 RX ORDER — ISOSULFAN BLUE 50 MG/5ML
INJECTION, SOLUTION SUBCUTANEOUS
Status: DISCONTINUED
Start: 2025-05-22 | End: 2025-05-22 | Stop reason: HOSPADM

## 2025-05-22 RX ORDER — SCOPOLAMINE 1 MG/3D
PATCH, EXTENDED RELEASE TRANSDERMAL
Status: COMPLETED
Start: 2025-05-22 | End: 2025-05-22

## 2025-05-22 RX ORDER — DIPHENHYDRAMINE HCL 25 MG
25 TABLET ORAL EVERY 6 HOURS PRN
Status: CANCELLED | OUTPATIENT
Start: 2025-05-22

## 2025-05-22 RX ORDER — BUPIVACAINE HYDROCHLORIDE 2.5 MG/ML
INJECTION, SOLUTION EPIDURAL; INFILTRATION; INTRACAUDAL; PERINEURAL
Status: DISCONTINUED | OUTPATIENT
Start: 2025-05-22 | End: 2025-05-22 | Stop reason: HOSPADM

## 2025-05-22 RX ORDER — HYDROMORPHONE HYDROCHLORIDE 1 MG/ML
0.4 INJECTION, SOLUTION INTRAMUSCULAR; INTRAVENOUS; SUBCUTANEOUS
Status: DISCONTINUED | OUTPATIENT
Start: 2025-05-22 | End: 2025-05-22 | Stop reason: HOSPADM

## 2025-05-22 RX ORDER — DEXAMETHASONE SODIUM PHOSPHATE 4 MG/ML
INJECTION, SOLUTION INTRA-ARTICULAR; INTRALESIONAL; INTRAMUSCULAR; INTRAVENOUS; SOFT TISSUE PRN
Status: DISCONTINUED | OUTPATIENT
Start: 2025-05-22 | End: 2025-05-22 | Stop reason: SURG

## 2025-05-22 RX ORDER — DIPHENHYDRAMINE HYDROCHLORIDE 50 MG/ML
25 INJECTION, SOLUTION INTRAMUSCULAR; INTRAVENOUS EVERY 6 HOURS PRN
Status: CANCELLED | OUTPATIENT
Start: 2025-05-22

## 2025-05-22 RX ORDER — OXYCODONE HCL 5 MG/5 ML
10 SOLUTION, ORAL ORAL
Status: DISCONTINUED | OUTPATIENT
Start: 2025-05-22 | End: 2025-05-22 | Stop reason: HOSPADM

## 2025-05-22 RX ORDER — HYDRALAZINE HYDROCHLORIDE 20 MG/ML
5 INJECTION INTRAMUSCULAR; INTRAVENOUS
Status: DISCONTINUED | OUTPATIENT
Start: 2025-05-22 | End: 2025-05-22 | Stop reason: HOSPADM

## 2025-05-22 RX ORDER — SODIUM CHLORIDE, SODIUM LACTATE, POTASSIUM CHLORIDE, CALCIUM CHLORIDE 600; 310; 30; 20 MG/100ML; MG/100ML; MG/100ML; MG/100ML
INJECTION, SOLUTION INTRAVENOUS CONTINUOUS
Status: DISCONTINUED | OUTPATIENT
Start: 2025-05-22 | End: 2025-05-22 | Stop reason: HOSPADM

## 2025-05-22 RX ORDER — MEPERIDINE HYDROCHLORIDE 25 MG/ML
6.25 INJECTION INTRAMUSCULAR; INTRAVENOUS; SUBCUTANEOUS
Status: DISCONTINUED | OUTPATIENT
Start: 2025-05-22 | End: 2025-05-22 | Stop reason: HOSPADM

## 2025-05-22 RX ORDER — DIPHENHYDRAMINE HYDROCHLORIDE 50 MG/ML
12.5 INJECTION, SOLUTION INTRAMUSCULAR; INTRAVENOUS
Status: DISCONTINUED | OUTPATIENT
Start: 2025-05-22 | End: 2025-05-22 | Stop reason: HOSPADM

## 2025-05-22 RX ORDER — OXYCODONE HCL 5 MG/5 ML
5 SOLUTION, ORAL ORAL
Status: DISCONTINUED | OUTPATIENT
Start: 2025-05-22 | End: 2025-05-22 | Stop reason: HOSPADM

## 2025-05-22 RX ORDER — CEFAZOLIN SODIUM 1 G/3ML
INJECTION, POWDER, FOR SOLUTION INTRAMUSCULAR; INTRAVENOUS PRN
Status: DISCONTINUED | OUTPATIENT
Start: 2025-05-22 | End: 2025-05-22 | Stop reason: SURG

## 2025-05-22 RX ORDER — KETOROLAC TROMETHAMINE 15 MG/ML
INJECTION, SOLUTION INTRAMUSCULAR; INTRAVENOUS PRN
Status: DISCONTINUED | OUTPATIENT
Start: 2025-05-22 | End: 2025-05-22 | Stop reason: SURG

## 2025-05-22 RX ORDER — MIDAZOLAM HYDROCHLORIDE 1 MG/ML
INJECTION INTRAMUSCULAR; INTRAVENOUS PRN
Status: DISCONTINUED | OUTPATIENT
Start: 2025-05-22 | End: 2025-05-22 | Stop reason: SURG

## 2025-05-22 RX ORDER — SCOPOLAMINE 1 MG/3D
PATCH, EXTENDED RELEASE TRANSDERMAL PRN
Status: DISCONTINUED | OUTPATIENT
Start: 2025-05-22 | End: 2025-05-22 | Stop reason: SURG

## 2025-05-22 RX ORDER — ACETAMINOPHEN 500 MG
1000 TABLET ORAL ONCE
Status: COMPLETED | OUTPATIENT
Start: 2025-05-22 | End: 2025-05-22

## 2025-05-22 RX ORDER — HYDROMORPHONE HYDROCHLORIDE 1 MG/ML
0.1 INJECTION, SOLUTION INTRAMUSCULAR; INTRAVENOUS; SUBCUTANEOUS
Status: DISCONTINUED | OUTPATIENT
Start: 2025-05-22 | End: 2025-05-22 | Stop reason: HOSPADM

## 2025-05-22 RX ORDER — LIDOCAINE HYDROCHLORIDE 20 MG/ML
INJECTION, SOLUTION EPIDURAL; INFILTRATION; INTRACAUDAL; PERINEURAL PRN
Status: DISCONTINUED | OUTPATIENT
Start: 2025-05-22 | End: 2025-05-22 | Stop reason: SURG

## 2025-05-22 RX ADMIN — FENTANYL CITRATE 50 MCG: 50 INJECTION, SOLUTION INTRAMUSCULAR; INTRAVENOUS at 09:12

## 2025-05-22 RX ADMIN — DEXAMETHASONE SODIUM PHOSPHATE 4 MG: 4 INJECTION INTRA-ARTICULAR; INTRALESIONAL; INTRAMUSCULAR; INTRAVENOUS; SOFT TISSUE at 08:54

## 2025-05-22 RX ADMIN — FENTANYL CITRATE 50 MCG: 50 INJECTION, SOLUTION INTRAMUSCULAR; INTRAVENOUS at 08:54

## 2025-05-22 RX ADMIN — PROPOFOL 150 MG: 10 INJECTION, EMULSION INTRAVENOUS at 08:54

## 2025-05-22 RX ADMIN — KETOROLAC TROMETHAMINE 15 MG: 15 INJECTION, SOLUTION INTRAMUSCULAR; INTRAVENOUS at 09:40

## 2025-05-22 RX ADMIN — LIDOCAINE HYDROCHLORIDE 50 MG: 20 INJECTION, SOLUTION EPIDURAL; INFILTRATION; INTRACAUDAL; PERINEURAL at 08:54

## 2025-05-22 RX ADMIN — SODIUM CHLORIDE, POTASSIUM CHLORIDE, SODIUM LACTATE AND CALCIUM CHLORIDE: 600; 310; 30; 20 INJECTION, SOLUTION INTRAVENOUS at 08:50

## 2025-05-22 RX ADMIN — ACETAMINOPHEN 1000 MG: 500 TABLET ORAL at 10:51

## 2025-05-22 RX ADMIN — ONDANSETRON 4 MG: 2 INJECTION INTRAMUSCULAR; INTRAVENOUS at 09:40

## 2025-05-22 RX ADMIN — CEFAZOLIN 2 G: 1 INJECTION, POWDER, FOR SOLUTION INTRAMUSCULAR; INTRAVENOUS at 08:54

## 2025-05-22 RX ADMIN — SCOPOLAMINE 1 PATCH: 1.5 PATCH, EXTENDED RELEASE TRANSDERMAL at 08:42

## 2025-05-22 RX ADMIN — MIDAZOLAM HYDROCHLORIDE 2 MG: 1 INJECTION, SOLUTION INTRAMUSCULAR; INTRAVENOUS at 08:50

## 2025-05-22 ASSESSMENT — PAIN DESCRIPTION - PAIN TYPE
TYPE: SURGICAL PAIN

## 2025-05-22 ASSESSMENT — FIBROSIS 4 INDEX
FIB4 SCORE: 0.52
FIB4 SCORE: 0.52

## 2025-05-22 ASSESSMENT — PAIN SCALES - GENERAL: PAIN_LEVEL: 2

## 2025-05-22 NOTE — ANESTHESIA TIME REPORT
Anesthesia Start and Stop Event Times       Date Time Event    5/22/2025 0834 Ready for Procedure     0850 Anesthesia Start     0946 Anesthesia Stop          Responsible Staff  05/22/25      Name Role Begin End    Omar Rivero M.D. Anesth 0850 0946          Overtime Reason:  no overtime (within assigned shift)    Comments:

## 2025-05-22 NOTE — OP REPORT
Patient Name: Beatrice Gomez   Medical Record Number: [id number]   Date of Service: 5/22/2025    Surgeon: Joana Downs MD FACS  Assistant: Judith Williamson PA-C    Operation:  Excision of right axillary mass (2.1cm)    Preoperative Diagnosis: Right axillary mass  Postoperative Diagnosis: Same    Anesthesia: General anesthesia via laryngeal mask airway.  Multimodal analgesia was initiated preoperatively by the anesthesia team.    Estimated Blood Loss: 5 mL    Specimen: Right axillary mass    Complications: none    Operative Findings: The lesion was excised in its entirety including the overlying skin.    Indications: Beatrice Gomez is a 25 y.o. female who developed a symptomatic enlarging mass in the right axilla and wished to have this excised.  A thorough discussion of the indications, alternatives, risks, and benefits to excisional biopsy was held with the patient.  All questions were answered in detail.  Informed consent was signed and placed in the chart.    Description of Operation:  The patient was brought into the operating room and placed supine on the operating table.  Preoperative antibiotics were administered and sequential compression devices were placed for venous thromboembolic prophylaxis.  Smooth General anesthesia via laryngeal mask airway was induced.  A preoperative timeout was performed confirming the patient's identity and the nature and location of the operation to be performed.    The right axilla was then prepped and draped in the usual sterile fashion using chlorhexidine.  The planned incision was marked on the skin in an elliptical fashion along Renae's lines including the skin directly overlying the lesion.  The planned incision and underlying breast tissue were anesthetized with a 1:1 mixture of 1% lidocaine plain and 0.25% bupivicaine plain as a field block.  The incision was then made sharply and continued into the subcutaneous tissue with  electrocautery.  The targeted tissue was dissected free from the surrounding axillary tissue using electrocautery.  The specimen was then oriented using CorrectClips and was submitted to pathology.  The incision was inspected for hemostasis, which was achieved with electrocautery.  The wound was then irrigated thoroughly with warm sterile saline; the effluent was clear.  The subcutaneous tissue was then gently reapproximated using interrupted 2-0 Vicryl sutures.  The skin was closed with interrupted 3-0 Monocryl deep dermal sutures and a 4-0 Monocryl running subcuticular suture.  Sterile Dermabond was applied.    The patient was then awakened from General anesthesia via laryngeal mask airway and was taken to the postanesthesia care unit in stable condition.  At the conclusion of the operation all sponge, needle, and instrument counts were reported correct x2 by the nursing staff.  Estimated blood loss was 5 mL.  The patient tolerated the procedure well with no immediate complications.  It is anticipated that the patient will be able to be discharged to home when PACU discharge criteria are met.

## 2025-05-22 NOTE — DISCHARGE INSTRUCTIONS
Postoperative Instructions: Lumpectomy or Excisional Biopsy    Medications  Pain medication: I recommend acetaminophen (Tylenol) 500-1000 mg every 6 hours around the clock.  You may also use ibuprofen (Advil, Motrin) 400 mg every 6 hours around the clock as needed.  These may be taken together or at alternating times (for example, Tylenol at 3:00 and 9:00, ibuprofen at 6:00 and 12:00).  Ice pack: use on and off for the first 72 hours.  Do not apply the ice pack directly onto your skin or for longer than 20 minutes at a time.  If the acetaminophen, ibuprofen, and ice packs are insufficient to make the surgical pain tolerable, take the additional pain medication prescribed, or call Dr Lim.  Bowel medication to prevent constipation: Miralax 1 scoop (17 grams) up to three times daily if taking prescription pain medication or if constipated after surgery.  Wound care  A small amount of minor bleeding, bruising, and swelling is expected.  If bleeding persists, apply steady pressure for 10 minutes.  Call if it does not stop after 10 minutes or if bruising is extensive.  Leave Steri-Strips on skin; we will remove them at your postoperative visit.  Wear the surgical bra or a sports bra day and night for the first 14 days after surgery.  OK to remove to shower starting 2 days after surgery.  Call if you develop a fever above 101° Fahrenheit or 38° Centigrade.  Redness will develop around the wound(s) within 36 hours and then discoloration will turn into purple bruising and yellow-green healing.  This is normal.  Increasing swelling and pain is not normal after 4 days.  Please call if after 4 days the wound is increasing in swelling and pain, or if there is spreading redness.  A bump or “healing ridge” will develop in the wound.  This may feel firm for up to 3-4 months and will go away slowly.  Shower  OK to shower 48 hours after surgery.  Do not scrub the incision! OK to let soapy water run over it and then pat dry.  OK  for Steri-Strips to get wet.  Diet  Resume your usual diet after surgery.  Do not drink alcoholic beverages today or while taking pain medication.  Activity  Do not drive, operate machinery, sign legal documents, or take responsibility for another person for at least 24 hours or while taking pain medication.  Please walk daily after surgery when you're steady on your feet; OK to go up and down stairs slowly.  No strenuous exercise, heavy lifting/pushing/pulling, or bouncing activities for at least 14 days.  Anesthesia  After surgery, due to the equipment used by anesthesia it is normal to have a slight sore throat and hoarseness for 48-72 hours.  The local anesthetic used in the incision and nerve block will wear off after ~12-18 hours, so it is normal to have increased pain at that point.  Additional questions  If you need further assistance during business hours, call the [facility] at .  If you need further assistance outside business hours, call Dr Lim's cell phone at .             What to Expect Post Anesthesia    Rest and take it easy for the first 24 hours.  A responsible adult is recommended to remain with you during that time.  It is normal to feel sleepy.  We encourage you to not do anything that requires balance, judgment or coordination.    FOR 24 HOURS DO NOT:  Drive, operate machinery or run household appliances.  Drink beer or alcoholic beverages.  Make important decisions or sign legal documents.    To avoid nausea, slowly advance diet as tolerated, avoiding spicy or greasy foods for the first day.  Add more substantial food to your diet according to your provider's instructions. INCREASE FLUIDS AND FIBER TO AVOID CONSTIPATION.    MILD FLU-LIKE SYMPTOMS ARE NORMAL.  YOU MAY EXPERIENCE GENERALIZED MUSCLE ACHES, THROAT IRRITATION, HEADACHE AND/OR SOME NAUSEA.    If any questions arise, call your provider. Dr. Lim @ [phone]     If your provider is not available,  please feel free to call the Surgical Center at [phone].    MEDICATIONS: Resume taking daily medication.  Take prescribed pain medication with food.  If no medication is prescribed, you may take non-aspirin pain medication if needed.  PAIN MEDICATION CAN BE VERY CONSTIPATING.  Take a stool softener or laxative such as senokot, pericolace, or milk of magnesia if needed.    Last pain medication given:   Toradol at 9:40 AM, Next dose Ibuprofen/Motrin okay to take @ 3:40 PM      Scopolamine patch okay to remove on 5/25/25, wash hands thoroughly upon removal, can remove beforehand if side effects of dizziness, drowsiness, dry mouth, too bothersome

## 2025-05-22 NOTE — ANESTHESIA POSTPROCEDURE EVALUATION
Patient: Beatrice Gomez    Procedure Summary       Date: 05/22/25 Room / Location: [facility] ROOM 27 / SURGERY SAME DAY [facility]    Anesthesia Start: 0850 Anesthesia Stop: 0946    Procedure: RIGHT AXILLARY EXCISIONAL BIOPSY (Right: Axilla) Diagnosis: (RIGHT AXILLARY MASS)    Surgeons: Joana Dallas M.D. Responsible Provider: Omar Rivero M.D.    Anesthesia Type: general ASA Status: 1            Final Anesthesia Type: general  Last vitals  BP   Blood Pressure: 111/58    Temp   36.1 °C (97 °F)    Pulse   66   Resp   18    SpO2   96 %      Anesthesia Post Evaluation    Patient location during evaluation: PACU  Patient participation: complete - patient participated  Level of consciousness: awake and alert  Pain score: 2    Airway patency: patent  Anesthetic complications: no  Cardiovascular status: hemodynamically stable  Respiratory status: acceptable  Hydration status: euvolemic    PONV: none          No notable events documented.     Nurse Pain Score: 2 (NPRS)

## 2025-05-22 NOTE — OR NURSING
0946 patient arrived from OR, report received, attached to monitoring. VSS, patient oxygenating well on 6 L simple mask, surgical site to right axilla with dermabond, fluff gauze, and x2 ace wraps, clean/dry/intact on arrival, OPA in place    0953 OPA dc     1004 patient denies pain and nausea at the moment, tolerates sips of water     1007 telephone updates to boyfriend, Paul    1025 boyfriend at bedside    1051 patient co pain, medicated per EMAR    1100 dc instructions discussed with patient and patient's boyfriend, questions answered     1115 patient getting dressed     1129 patient dc home in stable condition at this time, vss, surgical site remains clean/dry/intact, piv dc tip intact, all belongings with patient and accounted for, escorted out via

## 2025-05-22 NOTE — ANESTHESIA PREPROCEDURE EVALUATION
Case: [id number] Date/Time: 05/22/25 0845    Procedure: RIGHT AXILLARY EXCISIONAL BIOPSY (Right: Axilla)    Anesthesia type: General    Pre-op diagnosis: RIGHT UPPER LIMB MASS    Location: [facility] ROOM 27 / SURGERY SAME DAY [facility]    Surgeons: Joana Dallas M.D.            Relevant Problems   ANESTHESIA (within normal limits)      PULMONARY (within normal limits)      NEURO (within normal limits)      CARDIAC (within normal limits)      GI (within normal limits)       (within normal limits)      ENDO (within normal limits)      Other   (positive) Lump of axilla, right       Physical Exam    Airway   Mallampati: II  TM distance: >3 FB  Neck ROM: full       Cardiovascular - normal exam  Rhythm: regular  Rate: normal    (-) murmur     Dental - normal exam           Pulmonary - normal examBreath sounds clear to auscultation     Abdominal    Neurological - normal exam                   Anesthesia Plan    ASA 1       Plan - general       Airway plan will be LMA          Induction: intravenous    Postoperative Plan: Postoperative administration of opioids is intended.    Pertinent diagnostic labs and testing reviewed    Informed Consent:    Anesthetic plan and risks discussed with patient.    Use of blood products discussed with: patient whom consented to blood products.

## 2025-05-22 NOTE — ANESTHESIA PROCEDURE NOTES
Airway    Date/Time: 5/22/2025 8:54 AM    Performed by: Omar Rivero M.D.  Authorized by: mOar Rivero M.D.    Location:  OR  Urgency:  Elective  Indications for Airway Management:  Anesthesia      Spontaneous Ventilation: absent    Sedation Level:  Deep  Preoxygenated: Yes    Mask Difficulty Assessment:  0 - not attempted  Final Airway Type:  Supraglottic airway  Final Supraglottic Airway:  Standard LMA    SGA Size:  4  Number of Attempts at Approach:  1

## 2025-05-23 ENCOUNTER — PATIENT MESSAGE (OUTPATIENT)
Dept: SURGERY | Facility: MEDICAL CENTER | Age: 25
End: 2025-05-23
Payer: COMMERCIAL

## 2025-05-23 ENCOUNTER — RESULTS FOLLOW-UP (OUTPATIENT)
Dept: SURGERY | Facility: MEDICAL CENTER | Age: 25
End: 2025-05-23
Payer: COMMERCIAL

## 2025-05-23 NOTE — PROGRESS NOTES
I spoke with pt regarding her pictures and she denies any pain. The swelling has continued to improve. We discussed continuing with compression. She is aware if symptoms worsen to notify our office. Suspect her swelling was secondary to fluids. Informed her abt her post op visit next week and gave her the new location [address] [facility], [address].

## 2025-05-29 ENCOUNTER — OFFICE VISIT (OUTPATIENT)
Age: 25
End: 2025-05-29
Payer: COMMERCIAL

## 2025-05-29 VITALS
OXYGEN SATURATION: 99 % | HEART RATE: 64 BPM | WEIGHT: 143 LBS | TEMPERATURE: 97.6 F | DIASTOLIC BLOOD PRESSURE: 70 MMHG | HEIGHT: 67 IN | SYSTOLIC BLOOD PRESSURE: 114 MMHG | BODY MASS INDEX: 22.44 KG/M2

## 2025-05-29 DIAGNOSIS — D23.9 APOCRINE HIDROCYSTOMA: Primary | ICD-10-CM

## 2025-05-29 ASSESSMENT — FIBROSIS 4 INDEX: FIB4 SCORE: 0.52

## 2025-05-29 NOTE — PROGRESS NOTES
"    Subjective    Delightful 25F s/p R axillary mass excision 05/22/2025, here for postop check and review of pathology.  Since surgery she reports no acute issues; she initially had some swelling of the arm that has improved since she removed the compression wrap.  There is some discomfort in the right arm with supination and pressure on the medial anterior surface.  Functionally, the patient is slowly increasing her range of motion.    Pathology:  Benign apocrine hidrocystoma.  The patient was given a printed copy of her pathology report and it was discussed with her in detail.    Objective    /70 (BP Location: Left arm, Patient Position: Sitting, BP Cuff Size: Large adult)   Pulse 64   Temp 36.4 °C (97.6 °F) (Temporal)   Ht 1.702 m (5' 7\")   Wt 64.9 kg (143 lb)   LMP 04/26/2025 (Approximate)   SpO2 99%   BMI 22.40 kg/m²   Gen: Alert, oriented, pleasant, no acute distress  Chest: Respirations unlabored on room air with symmetric expansion  Abd: Soft, nondistended  Ext: Warm, well-perfused  Breasts: Right axillary incision clean, dry, intact.  No erythema.  No exudate.  No palpable masses.  No palpable fluctuance.  No ecchymosis.  Expected contour.  High in the axilla/posterior axilla is a healing abrasion from the compression wrap.    [facility] ECOG Performance Status categories: 1= Restricted in physically strenuous activity, but ambulatory and able to carry out work of a light sedentary nature, e.g., light housework, office work.    Assessment & Plan    Delightful 25F s/p R axillary excision for benign apocrine hidrocystoma, overall doing well postoperatively.  We discussed her pathology results and the next steps in detail.  All questions were answered to the patient's satisfaction.   - Referrals: No new referrals needed   - Return to clinic: 1 month wound check   - Next clinical breast exam: 01/2026   - Next breast imaging: Routine screening mammograms to begin at age 39.    Problem   Apocrine " Hidrocystoma    Right axillary 2.1cm subcutaneous cyst   - Excision 05/22/2025 (Raphael): Benign apocrine hidrocystoma

## 2025-06-27 DIAGNOSIS — D23.9 APOCRINE HIDROCYSTOMA: ICD-10-CM

## 2025-06-27 DIAGNOSIS — M25.621 STIFFNESS OF RIGHT UPPER ARM JOINT: Primary | ICD-10-CM

## 2025-06-27 NOTE — PROCEDURES
LVM to discuss placing a referral to LPT for arm/hand tightness and that it might be nerve related pain.

## 2025-06-30 NOTE — Clinical Note
REFERRAL APPROVAL NOTICE         Sent on June 30, 2025                   Beatrice Gomez  [address]  [place] [address]                   Dear Ms. Jonathan Gomez,    After a careful review of the medical information and benefit coverage, [facility] has processed your referral. See below for additional details.    If applicable, you must be actively enrolled with your insurance for coverage of the authorized service. If you have any questions regarding your coverage, please contact your insurance directly.    REFERRAL INFORMATION   Referral #:  [id number]  Referred-To Department    Referred-By Provider:  Physical Therapy    Anabella Hyde P.A.-C.   [facility] [address]      [address]  [address]  [place] [id number] [phone] [address]  [address]  [place] [id number] [phone]    Referral Start Date:  06/27/2025  Referral End Date:   06/28/2026             SCHEDULING  If you do not already have an appointment, please call [phone] to make an appointment.     MORE INFORMATION  If you do not already have a [employer] account, sign up at: [email or web address]  You can access your medical information, make appointments, see lab results, billing information, and more.  If you have questions regarding this referral, please contact  the [facility] Referrals department at:             [phone]. Monday - Friday 8:00AM - 5:00PM.     Sincerely,    [facility]

## 2025-07-03 DIAGNOSIS — M25.621 STIFFNESS OF RIGHT UPPER ARM JOINT: Primary | ICD-10-CM

## 2025-07-07 NOTE — Clinical Note
REFERRAL APPROVAL NOTICE         Sent on July 7, 2025                   Beatrice Gomez  [address]  [place] [address]                   Dear Ms. Jonathan Lockaughlin,    After a careful review of the medical information and benefit coverage, [facility] has processed your referral. See below for additional details.    If applicable, you must be actively enrolled with your insurance for coverage of the authorized service. If you have any questions regarding your coverage, please contact your insurance directly.    REFERRAL INFORMATION   Referral #:  [id number]  Referred-To Department    Referred-By Provider:  Physical Therapy    Judith Williamson P.A.-C.   Phys Therapy Op [facility]      [address]  [address]  [place] [address]  [phone] [address]  [place] [id number] [phone]    Referral Start Date:  07/03/2025  Referral End Date:   07/04/2026             SCHEDULING  If you do not already have an appointment, please call [phone] to make an appointment.     MORE INFORMATION  If you do not already have a [employer] account, sign up at: [email or web address]  You can access your medical information, make appointments, see lab results, billing information, and more.  If you have questions regarding this referral, please contact  the [facility] Referrals department at:             [phone]. Monday - Friday 8:00AM - 5:00PM.     Sincerely,    [facility]

## 2025-07-11 ENCOUNTER — TELEPHONE (OUTPATIENT)
Dept: INTERNAL MEDICINE | Facility: OTHER | Age: 25
End: 2025-07-11
Payer: MEDICAID

## 2025-07-11 NOTE — TELEPHONE ENCOUNTER
Patient called to book new patient appt with Dr May, informed her that DR May is not accepting new patients, she wanted to know in the future will Dr May be opening her panel again and if so, can she be put on a list to get scheduled?  If so, she will get a referral.  I did inform her that as of right now Dr May is keeping her panel closed but that I would send a message.

## 2025-07-14 NOTE — TELEPHONE ENCOUNTER
Called and LVM to ask patient more information regarding need to see derm.    We don't have a projection as to when  will open referrals at this time. I called pt to ask her the urgency, to see if Jeffrey is better suited for patient since we don't have availability until end of or next year.

## 2025-07-15 NOTE — TELEPHONE ENCOUNTER
Patient would like to have a possible cyst looked at and removed from her forehead and removal of a couple of warts.

## 2025-07-15 NOTE — TELEPHONE ENCOUNTER
Spoke to pt- informed her that we are not taking new referrals at this time and will let her know once the panel reopens. Pt is aware

## 2025-07-17 ENCOUNTER — OFFICE VISIT (OUTPATIENT)
Age: 25
End: 2025-07-17
Payer: MEDICAID

## 2025-07-17 VITALS
HEART RATE: 79 BPM | HEIGHT: 67 IN | WEIGHT: 139 LBS | TEMPERATURE: 97.9 F | BODY MASS INDEX: 21.82 KG/M2 | OXYGEN SATURATION: 100 %

## 2025-07-17 DIAGNOSIS — R22.31 ARM MASS, RIGHT: ICD-10-CM

## 2025-07-17 DIAGNOSIS — D23.9 APOCRINE HIDROCYSTOMA: Primary | ICD-10-CM

## 2025-07-17 PROCEDURE — 99024 POSTOP FOLLOW-UP VISIT: CPT | Performed by: SURGERY

## 2025-07-17 ASSESSMENT — FIBROSIS 4 INDEX: FIB4 SCORE: 0.52

## 2025-07-17 NOTE — PROGRESS NOTES
"    Subjective    Delightful 25F s/p right axillary excision of apocrine hidrocystoma 05/22/2025, here for wound check.  Currently, the patient reports no new breast or axillary concerns.  She did notice a new lump on her volar forearm, and is requesting a referral for this.    Objective    Pulse 79   Temp 36.6 °C (97.9 °F) (Temporal)   Ht 1.702 m (5' 7\")   Wt 63 kg (139 lb)   SpO2 100%   BMI 21.77 kg/m²   Gen: Alert, oriented, pleasant, no acute distress  Chest: Respirations unlabored on room air with symmetric expansion  Abd: Soft, nondistended  Ext: Warm, well-perfused.  Right volar forearm, distal third, with ~1.3cm firm palpable nodule not clearly fixed to skin.  Breasts: Right axillary incision clean, dry, intact.  No erythema.  No exudate.  No palpable masses.  No palpable fluctuance.  No ecchymosis.  Excellent contour.  Full range of motion at the shoulder.  Bilateral breasts examined in the upright and supine positions.  No suspicious skin changes (erythema, peau d'orange).  No unexpected contour abnormalities.  Bilateral breast tissue extremely dense with no dominant masses or nodules.  Bilateral nipples everted without expressible discharge.  No palpable cervical, supraclavicular, or axillary adenopathy bilaterally.    [facility] ECOG Performance Status categories: 0= Fully active, able to carry on all pre-disease performance without restriction.    Assessment & Plan    Delightful 25F s/p R axillary excision of apocrine hidrocystoma, overall doing very well postoperatively.  All questions were answered to the patient's satisfaction.   - Referrals: Referral placed to Dr Carmel Smith of hand surgery for evaluation of forearm nodule.   - Return to clinic: 01/2026   - Next clinical breast exam: 01/2026   - Next breast imaging: Routine screening mammograms to begin at age 39.    Problem   Apocrine Hidrocystoma    Right axillary 2.1cm nodule  - Excision 05/22/2025 (Raphael): Benign apocrine hidrocystoma.       "

## 2025-07-18 NOTE — Clinical Note
REFERRAL APPROVAL NOTICE         Sent on July 18, 2025                   Beatrice Gomez  [address]  [facility] [place] [facility] [address]                   Dear MsErin Gomez,    After a careful review of the medical information and benefit coverage, [facility] has processed your referral. See below for additional details.    If applicable, you must be actively enrolled with your insurance for coverage of the authorized service. If you have any questions regarding your coverage, please contact your insurance directly.    REFERRAL INFORMATION   Referral #:  [id number]  Referred-To Department    Referred-By Provider:  Hand Surgery    Joana Dallas M.D.   [facility] Hand      [address]  [facility] [address]  [facility] [address]  [phone] [address] [place] [address]  [phone]    Referral Start Date:  07/17/2025  Referral End Date:   07/17/2026             SCHEDULING  If you do not already have an appointment, please call [phone] to make an appointment.     MORE INFORMATION  If you do not already have a [employer] account, sign up at: [email or web address]  You can access your medical information, make appointments, see lab results, billing information, and more.  If you have questions regarding this referral, please contact  the [facility] Referrals department at:             [phone]. Monday - Friday 8:00AM - 5:00PM.     Sincerely,    [facility]

## 2025-07-21 DIAGNOSIS — R22.31 ARM MASS, RIGHT: Primary | ICD-10-CM

## 2025-07-22 NOTE — OP THERAPY EVALUATION
Outpatient Physical Therapy  INITIAL EVALUATION    [facility] [place] [id number] [facility], [address]  [place] [address]  Phone:  [phone]    Date of Evaluation: 07/24/2025    Patient: Beatrice Gomez  YOB: 2000  MRN: [id number]     Referring Provider: Judith Williamson P.A.-C.  [address]  [facility] [address]  [place] [address]   Referring Diagnosis Stiffness of right upper arm joint [M25.621]     Time Calculation  Start time: 0700  Stop time: 0745 Time Calculation (min): 45 minutes         Chief Complaint: No chief complaint on file.    Visit Diagnoses     ICD-10-CM   1. Stiffness of right upper arm joint  M25.621       Date of onset of impairment: 5/22/2025    Subjective:   History of Present Illness:     Mechanism of injury:  CMHx: Pt indicates she had a surgery on 5/22/25 with a lumpectomy in her R axilla/ She notes that she is still trying to get things back to normal. It has been getting better but wants to get things as good as possible.     Pain Behavior  Sxs: R axila and R forearm tightness; denies N/T. Rare tightness in R dorsal pad between thumb/index finger    Aggs: moving arm overhead and with shoulder rotated overhead she feels this spot    Eas: stretching     Irritability: low    PMHx: none of note    Profession/Recreation: climbs, hikes/camps, runs, med student at the moment    Goals: show HEP                            Past Medical History[1]  Past Surgical History[2]  Social History     Tobacco Use    Smoking status: Never    Smokeless tobacco: Never   Substance Use Topics    Alcohol use: Yes     Alcohol/week: 0.6 oz     Types: 1 Standard drinks or equivalent per week     Comment: 2 drinks a month     Social Hx - Family and Occupational[3]    Objective     General Comments     Shoulder Comments   Shoulder AROM all WFL no pain with OP OH, ER/HBB normal  If twists UE into ER fully feels at forearm and armpit*    C/S AROM with OP WFL  (-) spurlings  (-) 1st  "rib  (-) p-as causing sxs    (\"+\") ULTT-ulnar; 0 deg of E, LUE full ER, 10 deg ABD prior to tightness    Pain if tacking down tissue with armpit into flexion worse than if just pure PROM    R shoulder PROM flexion and abduction* (flexion worse)    TTP structures around armpit RUE    Scar mobility fair         Therapeutic Exercises (CPT 57219):     1. UPOC 9/22/25      Therapeutic Exercise Summary: Home Exercise Program Created and Reviewed with patient    Ulnar tensioner at wall stretch on/off x1-2' x2' day  STW to armpit gentle or TB at wall      Therapeutic Treatments and Modalities:     1. Manual Therapy (CPT 73807), cupping to axila/upper arm, no charge    Time-based treatments/modalities:    Physical Therapy Timed Treatment Charges  Manual therapy minutes (CPT 16282): 0 minutes  Therapeutic exercise minutes (CPT 70648): 0 minutes      Assessment, Response and Plan:   Impairments: impaired functional mobility, lacks appropriate home exercise program and pain with function    Assessment details:  PT finds s/sx consistent with post operative tightness of her RUE following a lumpectomy on 5/22/25 of her axilla. She has deficits in n tension through an ulnar distribution along with some pain with end range flexion and with tissue deficits locally around her surgical site. She can benefit from skilled PT care to address this to improve her reaching without pain for IADL and ADL improvement.   Barriers to therapy:  Comorbidities  Prognosis: fair    Goals:   Short Term Goals:   -pt meets [facility] for QUICKDASH  -pt with ability to reach OH with rotation without pain in distal UE to improve her capacity for things like climbing   -pt with indep HEP to address stiffness in axillary and RUE >50% compliance  Short term goal time span:  2-4 weeks      Long Term Goals:    -pt meets [facility] for QUICKDASH  -pt with ability to reach OH with rotation without pain to improve her capacity for things like climbing   -pt with indep HEP to " address stiffness in axillary and RUE >50% compliance  -no sxs IADLs and recreation >90% of the time  Long term goal time span:  6-8 weeks    Plan:   Therapy options:  Physical therapy treatment to continue  Planned therapy interventions:  Neuromuscular Re-education (CPT 61477) and Therapeutic Exercise (CPT 12533)  Other planned therapy interventions:  CPT 97110 x3 units, CPT 97112 x1 unit  Frequency:  2x week  Duration in weeks:  8  Duration in visits:  16  Discussed with:  Patient      Functional Assessment Used  PT Functional Assessment Tool Used: UEFI  PT Functional Assessment Score: 78/80     Referring provider co-signature:  I have reviewed this plan of care and my co-signature certifies the need for services.    Certification Period: 07/24/2025 to  09/25/25    Physician Signature: ________________________________ Date: ______________                        [1]   Past Medical History:  Diagnosis Date    Apocrine hidrocystoma 06/20/2023    Right axillary 2.1cm subcutaneous cyst   - Excision 05/22/2025 ([facility]): Benign apocrine hidrocystoma      PONV (postoperative nausea and vomiting)    [2]   Past Surgical History:  Procedure Laterality Date    TUMOR EXCISION WITH BIOPSY Right 5/22/2025    Procedure: RIGHT AXILLARY EXCISIONAL BIOPSY;  Surgeon: Joana Dallas M.D.;  Location: SURGERY SAME DAY [facility];  Service: General    OTHER      wisdom teeth    RECONSTRUCTION, KNEE, ACL Bilateral    [3]   Family and Occupational History  Socioeconomic History    Marital status: Single     Spouse name: Not on file    Number of children: Not on file    Years of education: Not on file    Highest education level: Not on file   Occupational History    Not on file

## 2025-07-23 ENCOUNTER — APPOINTMENT (OUTPATIENT)
Dept: RADIOLOGY | Facility: MEDICAL CENTER | Age: 25
End: 2025-07-23
Attending: SURGERY
Payer: MEDICAID

## 2025-07-23 DIAGNOSIS — R22.31 ARM MASS, RIGHT: ICD-10-CM

## 2025-07-23 PROCEDURE — 76882 US LMTD JT/FCL EVL NVASC XTR: CPT | Mod: RT

## 2025-07-24 ENCOUNTER — PHYSICAL THERAPY (OUTPATIENT)
Dept: PHYSICAL THERAPY | Facility: REHABILITATION | Age: 25
End: 2025-07-24
Attending: SPECIALIST
Payer: MEDICAID

## 2025-07-24 DIAGNOSIS — M25.621 STIFFNESS OF RIGHT UPPER ARM JOINT: Primary | ICD-10-CM

## 2025-07-24 PROCEDURE — 97162 PT EVAL MOD COMPLEX 30 MIN: CPT

## 2025-08-11 ENCOUNTER — APPOINTMENT (OUTPATIENT)
Dept: MEDICAL GROUP | Facility: CLINIC | Age: 25
End: 2025-08-11
Payer: MEDICAID

## 2025-08-11 ENCOUNTER — HOSPITAL ENCOUNTER (OUTPATIENT)
Facility: MEDICAL CENTER | Age: 25
End: 2025-08-11
Payer: MEDICAID

## 2025-08-11 DIAGNOSIS — Z12.4 CERVICAL CANCER SCREENING: ICD-10-CM

## 2025-08-11 PROCEDURE — 88175 CYTOPATH C/V AUTO FLUID REDO: CPT

## 2025-08-11 ASSESSMENT — FIBROSIS 4 INDEX: FIB4 SCORE: 0.52

## 2025-08-12 ENCOUNTER — PHYSICAL THERAPY (OUTPATIENT)
Dept: PHYSICAL THERAPY | Facility: REHABILITATION | Age: 25
End: 2025-08-12
Attending: SPECIALIST
Payer: MEDICAID

## 2025-08-12 DIAGNOSIS — M25.621 STIFFNESS OF RIGHT UPPER ARM JOINT: Primary | ICD-10-CM

## 2025-08-12 PROCEDURE — 97140 MANUAL THERAPY 1/> REGIONS: CPT

## 2025-08-12 PROCEDURE — 97110 THERAPEUTIC EXERCISES: CPT

## 2025-08-14 LAB — THINPREP PAP, CYTOLOGY NL11781: NORMAL

## 2025-08-19 ENCOUNTER — PHYSICAL THERAPY (OUTPATIENT)
Dept: PHYSICAL THERAPY | Facility: REHABILITATION | Age: 25
End: 2025-08-19
Attending: SPECIALIST
Payer: MEDICAID

## 2025-08-19 DIAGNOSIS — M25.621 STIFFNESS OF RIGHT UPPER ARM JOINT: Primary | ICD-10-CM

## 2025-08-19 PROCEDURE — 97110 THERAPEUTIC EXERCISES: CPT

## 2025-08-19 PROCEDURE — 97140 MANUAL THERAPY 1/> REGIONS: CPT

## 2025-08-26 ENCOUNTER — HOSPITAL ENCOUNTER (OUTPATIENT)
Dept: LAB | Facility: MEDICAL CENTER | Age: 25
End: 2025-08-26
Payer: MEDICAID

## 2025-08-26 DIAGNOSIS — Z01.419 WELL WOMAN EXAM WITH ROUTINE GYNECOLOGICAL EXAM: ICD-10-CM

## 2025-08-26 LAB
25(OH)D3 SERPL-MCNC: 30 NG/ML (ref 30–100)
ALBUMIN SERPL BCP-MCNC: 4.2 G/DL (ref 3.2–4.9)
ALBUMIN/GLOB SERPL: 1.7 G/DL
ALP SERPL-CCNC: 75 U/L (ref 30–99)
ALT SERPL-CCNC: 21 U/L (ref 2–50)
ANION GAP SERPL CALC-SCNC: 9 MMOL/L (ref 7–16)
AST SERPL-CCNC: 25 U/L (ref 12–45)
BILIRUB SERPL-MCNC: 0.4 MG/DL (ref 0.1–1.5)
BUN SERPL-MCNC: 15 MG/DL (ref 8–22)
CALCIUM ALBUM COR SERPL-MCNC: 8.9 MG/DL (ref 8.5–10.5)
CALCIUM SERPL-MCNC: 9.1 MG/DL (ref 8.5–10.5)
CHLORIDE SERPL-SCNC: 111 MMOL/L (ref 96–112)
CHOLEST SERPL-MCNC: 160 MG/DL (ref 100–199)
CO2 SERPL-SCNC: 23 MMOL/L (ref 20–33)
CREAT SERPL-MCNC: 0.86 MG/DL (ref 0.5–1.4)
ERYTHROCYTE [DISTWIDTH] IN BLOOD BY AUTOMATED COUNT: 42.7 FL (ref 35.9–50)
EST. AVERAGE GLUCOSE BLD GHB EST-MCNC: 111 MG/DL
GFR SERPLBLD CREATININE-BSD FMLA CKD-EPI: 95 ML/MIN/1.73 M 2
GLOBULIN SER CALC-MCNC: 2.5 G/DL (ref 1.9–3.5)
GLUCOSE SERPL-MCNC: 92 MG/DL (ref 65–99)
HBA1C MFR BLD: 5.5 % (ref 4–5.6)
HCT VFR BLD AUTO: 41 % (ref 37–47)
HDLC SERPL-MCNC: 57 MG/DL
HGB BLD-MCNC: 13.3 G/DL (ref 12–16)
LDLC SERPL CALC-MCNC: 93 MG/DL
MCH RBC QN AUTO: 29.2 PG (ref 27–33)
MCHC RBC AUTO-ENTMCNC: 32.4 G/DL (ref 32.2–35.5)
MCV RBC AUTO: 89.9 FL (ref 81.4–97.8)
PLATELET # BLD AUTO: 273 K/UL (ref 164–446)
PMV BLD AUTO: 10.2 FL (ref 9–12.9)
POTASSIUM SERPL-SCNC: 4.8 MMOL/L (ref 3.6–5.5)
PROT SERPL-MCNC: 6.7 G/DL (ref 6–8.2)
RBC # BLD AUTO: 4.56 M/UL (ref 4.2–5.4)
SODIUM SERPL-SCNC: 143 MMOL/L (ref 135–145)
TRIGL SERPL-MCNC: 49 MG/DL (ref 0–149)
TSH SERPL DL<=0.005 MIU/L-ACNC: 2.17 UIU/ML (ref 0.38–5.33)
WBC # BLD AUTO: 5 K/UL (ref 4.8–10.8)

## 2025-08-26 PROCEDURE — 85027 COMPLETE CBC AUTOMATED: CPT

## 2025-08-26 PROCEDURE — 36415 COLL VENOUS BLD VENIPUNCTURE: CPT

## 2025-08-26 PROCEDURE — 82306 VITAMIN D 25 HYDROXY: CPT

## 2025-08-26 PROCEDURE — 80061 LIPID PANEL: CPT

## 2025-08-26 PROCEDURE — 84443 ASSAY THYROID STIM HORMONE: CPT

## 2025-08-26 PROCEDURE — 80053 COMPREHEN METABOLIC PANEL: CPT

## 2025-08-26 PROCEDURE — 83036 HEMOGLOBIN GLYCOSYLATED A1C: CPT

## (undated) DEVICE — SYRINGE 30 ML LL (56/BX)

## (undated) DEVICE — GLOVE BIOGEL SZ 6.5 SURGICAL PF LTX (50PR/BX 4BX/CA)

## (undated) DEVICE — DRAPE LAPAROTOMY T SHEET - (12EA/CA)

## (undated) DEVICE — CLIP INTERNAL LIGATE TITANIUM MEDIUM WECK HORIZON (6EA/PK 30PK/BX)

## (undated) DEVICE — GLOVE BIOGEL INDICATOR SZ 6.5 SURGICAL PF LTX - (50PR/BX 4BX/CA)

## (undated) DEVICE — GLOVE BIOGEL INDICATOR SZ 7.5 SURGICAL PF LTX - (50PR/BX 4BX/CA)

## (undated) DEVICE — TUBE CONNECTING SUCTION - CLEAR PLASTIC STERILE 72 IN (50EA/CA)

## (undated) DEVICE — SLEEVE VASO DVT COMPRESSION CALF MED - (10PR/CA)

## (undated) DEVICE — LACTATED RINGERS INJ 1000 ML - (14EA/CA 60CA/PF)

## (undated) DEVICE — SENSOR OXIMETER ADULT SPO2 RD SET (20EA/BX)

## (undated) DEVICE — SET LEADWIRE 5 LEAD BEDSIDE DISPOSABLE ECG (1SET OF 5/EA)

## (undated) DEVICE — CANISTER SUCTION 3000ML MECHANICAL FILTER AUTO SHUTOFF MEDI-VAC NONSTERILE LF DISP (40EA/CA)

## (undated) DEVICE — BANDAGE ELASTIC 6 HONEYCOMB - 6X5YD LF (20/CA)"

## (undated) DEVICE — TOWEL STOP TIMEOUT SAFETY FLAG (40EA/CA)

## (undated) DEVICE — DERMABOND ADVANCED - (12EA/BX)

## (undated) DEVICE — KIT  I.V. START (100EA/CA)

## (undated) DEVICE — MARKER CORRECT CLIPS (1EA) - MIN ORDER OF 24 EA MUST BE INCREMENTS OF 24

## (undated) DEVICE — PLUMEPEN ULTRA 3/8 IN X 10 FT HOSE (20EA/CA)

## (undated) DEVICE — SUCTION INSTRUMENT YANKAUER BULBOUS TIP W/O VENT (50EA/CA)

## (undated) DEVICE — GOWN WARMING STANDARD FLEX - (30/CA)

## (undated) DEVICE — CANISTER SUCTION RIGID RED 1500CC (40EA/CA)

## (undated) DEVICE — CLIP SM INTNL HRZN TI ESCP LGT - (24EA/PK 25PK/BX)

## (undated) DEVICE — SODIUM CHL IRRIGATION 0.9% 1000ML (12EA/CA)

## (undated) DEVICE — MASK OXYGEN VNYL ADLT MED CONC WITH 7 FOOT TUBING - (50EA/CA)

## (undated) DEVICE — CANNULA O2 COMFORT SOFT EAR ADULT 7 FT TUBING (50/CA)

## (undated) DEVICE — SUTURE 2-0 VICRYL PLUS SH - 27 INCH (36/BX)

## (undated) DEVICE — PAD LAP STERILE 18 X 18 - (5/PK 40PK/CA)

## (undated) DEVICE — SUTURE 4-0 MONOCRYL PLUS PS-2 - 27 INCH (36/BX)

## (undated) DEVICE — PACK MINOR ROSEVIEW - (7EA/CA)

## (undated) DEVICE — SUTURE MONOCRYL PLUS UD 27IN(70CM) USP3-0(M2) S/A PS-2 PRM MP (36PK/BX)

## (undated) DEVICE — NEEDLE SAFETY 18 GA X 1 1/2 IN (100EA/BX)

## (undated) DEVICE — SUTURE GENERAL

## (undated) DEVICE — TUBING CLEARLINK DUO-VENT - C-FLO (48EA/CA)

## (undated) DEVICE — GLOVE BIOGEL SZ 7.5 SURGICAL PF LTX - (50PR/BX 4BX/CA)